# Patient Record
Sex: FEMALE | Race: WHITE | NOT HISPANIC OR LATINO | Employment: FULL TIME | URBAN - METROPOLITAN AREA
[De-identification: names, ages, dates, MRNs, and addresses within clinical notes are randomized per-mention and may not be internally consistent; named-entity substitution may affect disease eponyms.]

---

## 2017-02-01 ENCOUNTER — OFFICE VISIT (OUTPATIENT)
Dept: INTERNAL MEDICINE | Facility: CLINIC | Age: 42
End: 2017-02-01
Payer: COMMERCIAL

## 2017-02-01 VITALS
RESPIRATION RATE: 16 BRPM | DIASTOLIC BLOOD PRESSURE: 78 MMHG | HEART RATE: 69 BPM | TEMPERATURE: 98 F | BODY MASS INDEX: 23.7 KG/M2 | HEIGHT: 62 IN | WEIGHT: 128.8 LBS | OXYGEN SATURATION: 100 % | SYSTOLIC BLOOD PRESSURE: 116 MMHG

## 2017-02-01 DIAGNOSIS — J40 BRONCHITIS: Primary | ICD-10-CM

## 2017-02-01 PROCEDURE — 99213 OFFICE O/P EST LOW 20 MIN: CPT | Performed by: INTERNAL MEDICINE

## 2017-02-01 RX ORDER — AZITHROMYCIN 250 MG/1
TABLET, FILM COATED ORAL
Qty: 6 TABLET | Refills: 0 | Status: SHIPPED | OUTPATIENT
Start: 2017-02-01 | End: 2017-08-30

## 2017-02-01 NOTE — PATIENT INSTRUCTIONS
Please take the antibiotics and let us know if you are not better after 5 complete days on it.    You may use acetaminophen or ibuprofen and heating packs for your chest pain.    For the dryness under your eyes: stop using the new mascara and if the dryness is not better in 2 weeks, you can try using hydrocortisone (at the concentrations that they sell over the counter) for up to 2 weeks and if the rash is still there, you may need to contact your Dermatologist at that point.

## 2017-02-01 NOTE — NURSING NOTE
"Chief Complaint   Patient presents with     Cough       Initial /78 mmHg  Pulse 69  Temp(Src) 98  F (36.7  C) (Oral)  Resp 16  Ht 5' 2\" (1.575 m)  Wt 128 lb 12.8 oz (58.423 kg)  BMI 23.55 kg/m2  SpO2 100% Estimated body mass index is 23.55 kg/(m^2) as calculated from the following:    Height as of this encounter: 5' 2\" (1.575 m).    Weight as of this encounter: 128 lb 12.8 oz (58.423 kg).  BP completed using cuff size: erik Crawford CMA    "

## 2017-02-01 NOTE — MR AVS SNAPSHOT
After Visit Summary   2/1/2017    Megan Kearney    MRN: [id number]           Patient Information     Date Of Birth          1975        Visit Information        Provider Department      2/1/2017 2:20 PM Jaycee Ryan MD [facility]        Care Instructions    Please take the antibiotics and let us know if you are not better after 5 complete days on it.    You may use acetaminophen or ibuprofen and heating packs for your chest pain.    For the dryness under your eyes: stop using the new mascara and if the dryness is not better in 2 weeks, you can try using hydrocortisone (at the concentrations that they sell over the counter) for up to 2 weeks and if the rash is still there, you may need to contact your Dermatologist at that point.          Follow-ups after your visit        Who to contact     If you have questions or need follow up information about today's clinic visit or your schedule please contact [facility] directly at [phone].  Normal or non-critical lab and imaging results will be communicated to you by [employer], letter or phone within 4 business days after the clinic has received the results. If you do not hear from us within 7 days, please contact the clinic through [employer] or phone. If you have a critical or abnormal lab result, we will notify you by phone as soon as possible.  Submit refill requests through [employer] or call your pharmacy and they will forward the refill request to us. Please allow 3 business days for your refill to be completed.          Additional Information About Your Visit        [employer] Information     [employer] gives you secure access to your electronic health record. If you see a primary care provider, you can also send messages to your care team and make appointments. If you have questions, please call your primary care clinic.  If you do not have a primary care provider, please call [phone] and they will assist  "you.        Care EveryWhere ID     This is your Care EveryWhere ID. This could be used by other organizations to access your [place] medical records  [id number]        Your Vitals Were     Pulse Temperature Respirations Height BMI (Body Mass Index) Pulse Oximetry    69 98  F (36.7  C) (Oral) 16 5' 2\" (1.575 m) 23.55 kg/m2 100%       Blood Pressure from Last 3 Encounters:   02/01/17 116/78   08/15/16 111/75   07/07/16 114/71    Weight from Last 3 Encounters:   02/01/17 128 lb 12.8 oz (58.423 kg)   08/15/16 129 lb (58.514 kg)   07/07/16 126 lb (57.153 kg)              Today, you had the following     No orders found for display       Primary Care Provider Office Phone # Fax #    Bing Coughlin [phone]       [facility] [address] [facility] [id number]        Thank you!     Thank you for choosing [facility]  for your care. Our goal is always to provide you with excellent care. Hearing back from our patients is one way we can continue to improve our services. Please take a few minutes to complete the written survey that you may receive in the mail after your visit with us. Thank you!             Your Updated Medication List - Protect others around you: Learn how to safely use, store and throw away your medicines at www.disposemymeds.org.          This list is accurate as of: 2/1/17  3:31 PM.  Always use your most recent med list.                   Brand Name Dispense Instructions for use    aluminum chloride 20 % external solution    DRYSOL    60 mL    Apply topically At Bedtime As sweating improves, decrease use to 1-2 times weekly.       calcium + D 600-200 MG-UNIT Tabs   Generic drug:  calcium carbonate-vitamin D      1 TABLET DAILY       carboxymethylcellul-glycerin 0.5-0.9 % Soln ophthalmic solution    OPTIVE     Apply 1 drop to eye 3 times daily. \"Refresh Optive artificial tears\"       clindamycin 1 % topical gel    CLINDAMAX    60 g    Apply topically " 2 times daily Profile RX - do not fill until pt requests or is due for refill.       flax seed oil 1000 MG capsule      1 daily       fluticasone 50 MCG/ACT spray    FLONASE    48 g    Spray 2 sprays into both nostrils daily       MULTIVITAMIN TABS   OR      1 TABLET DAILY       mupirocin 2 % cream    BACTROBAN    30 g    Apply topically 3 times daily       triamcinolone 0.1 % cream    KENALOG    453.6 g    Apply  topically 2 times daily.       valACYclovir 1000 mg tablet    VALTREX    12 tablet    2 TABLETS every 12 hours for two doses       zolpidem 5 MG tablet    AMBIEN    90 tablet    Take 1 tablet (5 mg) by mouth nightly as needed for sleep at bedtime.

## 2017-02-01 NOTE — PROGRESS NOTES
SUBJECTIVE:                                                    Megan Kearney is a 41 year old female who presents to clinic today for the following health issues:      ENT Symptoms             Symptoms: cc Present Absent Comment   Fever/Chills  x  Almost 2 wks ago, not since then   Fatigue  x     Muscle Aches  x     Eye Irritation  x     Sneezing  x     Nasal Rickey/Drg  x  Clear secretions    Sinus Pressure/Pain  x  Just a bit    Loss of smell  x     Dental pain  x     Sore Throat   x    Swollen Glands   x    Ear Pain/Fullness  x  Just a bit of pear pain   Cough  x  She tries to avoid coughing anything up as it hurts to much to cough.    Wheeze  x     Chest Pain  x  Left-sided, pressure when she lies down . Its uncomfortable, pain with coughing and with taking a deep breath, feels like needles.  There is a comfortable position that she can find.  This chest pain began about 8 days ago-today, she felt that it may have gotten a bit worse.    Shortness of breath  x     Rash       Other  x  Dry under eyes     Symptom duration:  almost 2 weeks   Symptom severity:  mild can still function   Treatments tried:  none   Contacts:  minor colds     Has a history of chronic rhinorrhea.   No personal or family history of blood clots.    No recent immobility.          Problem list and histories reviewed & adjusted, as indicated.  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     Hx of LASIK     MMT (medial meniscus tear)     Psoriasis     Past Surgical History   Procedure Laterality Date     D & c       Lasik       2008     Hc hysteros w permanent fallopain implant       essure     Arthroscopy knee Right 5/27/2015     Procedure: ARTHROSCOPY KNEE;   "Surgeon: Dima Haile MD;  Location:  OR       Social History   Substance Use Topics     Smoking status: Never Smoker      Smokeless tobacco: Never Used      Comment: Nonsmoking household     Alcohol Use: No     Family History   Problem Relation Age of Onset     CANCER Paternal Grandmother 50     started as colon, mets to liver     DIABETES Paternal Grandmother      Hypertension Mother      Thyroid Disease Mother      Hypertension Maternal Grandmother      Hypertension Maternal Grandfather      GASTROINTESTINAL DISEASE Maternal Uncle      crohns     GASTROINTESTINAL DISEASE Sister      colitis     Glaucoma No family hx of      Macular Degeneration No family hx of          Current Outpatient Prescriptions   Medication Sig Dispense Refill     azithromycin (ZITHROMAX) 250 MG tablet Two tablets first day, then one tablet daily for four days. 6 tablet 0     fluticasone (FLONASE) 50 MCG/ACT nasal spray Spray 2 sprays into both nostrils daily 48 g 3     clindamycin (CLINDAMAX) 1 % gel Apply topically 2 times daily Profile RX - do not fill until pt requests or is due for refill. 60 g 12     zolpidem (AMBIEN) 5 MG tablet Take 1 tablet (5 mg) by mouth nightly as needed for sleep at bedtime. 90 tablet 1     valACYclovir (VALTREX) 1000 mg tablet 2 TABLETS every 12 hours for two doses 12 tablet 6     triamcinolone (KENALOG) 0.1 % cream Apply  topically 2 times daily. 453.6 g 1     mupirocin (BACTROBAN) 2 % cream Apply topically 3 times daily 30 g 3     carboxymethylcellul-glycerin (OPTIVE) 0.5-0.9 % SOLN Apply 1 drop to eye 3 times daily. \"Refresh Optive artificial tears\"       CALCIUM + D 600-200 MG-UNIT OR TABS 1 TABLET DAILY       MULTIVITAMIN TABS   OR 1 TABLET DAILY       FLAX SEED OIL 1000 MG OR CAPS 1 daily       aluminum chloride (DRYSOL) 20 % external solution Apply topically At Bedtime As sweating improves, decrease use to 1-2 times weekly. 60 mL 3 " "        ==============================================================  ROS:  Constitutional, HEENT, cardiovascular, pulmonary, GI, , musculoskeletal, neuro, skin, endocrine and psych systems are negative, except as otherwise noted.      OBJECTIVE:                                                    /78 mmHg  Pulse 69  Temp(Src) 98  F (36.7  C) (Oral)  Resp 16  Ht 5' 2\" (1.575 m)  Wt 128 lb 12.8 oz (58.423 kg)  BMI 23.55 kg/m2  SpO2 100%  Body mass index is 23.55 kg/(m^2).       GENERAL APPEARANCE: healthy, alert and in no distress  EYES: Eyes grossly normal to inspection, and conjunctivae and sclerae normal  HENT: ear canals and TM's normal, nose and mouth without ulcers or lesions, oropharynx clear and oral mucous membranes moist  NECK: no adenopathy, no asymmetry, masses, or scars   RESP: lungs clear to auscultation - no rales, rhonchi or wheezes  CV: regular rate and rhythm, normal S1 S2, no S3 or S4, no murmur, click or rub, no peripheral edema and peripheral pulses strong  ABDOMEN: soft, nontender, no hepatosplenomegaly, no masses and bowel sounds normal  MS:   Left middle back: tender to palpation   no musculoskeletal defects are noted and gait is age appropriate without ataxia  SKIN: no suspicious lesions or rashes  NEURO: mentation intact and speech normal  PSYCH: mentation appears normal and affect normal/bright.      ASSESSMENT/PLAN:                                                        ICD-10-CM    1. Bronchitis J40 azithromycin (ZITHROMAX) 250 MG tablet     Chest pain, etc:  With accompanying pleurisy and pulled back muscle  PLAN:  Patient Instructions   Please take the antibiotics and let us know if you are not better after 5 complete days on it.    You may use acetaminophen or ibuprofen and heating packs for your chest pain.    For the dryness under your eyes: stop using the new mascara and if the dryness is not better in 2 weeks, you can try using hydrocortisone (at the concentrations " that they sell over the counter) for up to 2 weeks and if the rash is still there, you may need to contact your Dermatologist at that point.                       Jaycee Ryan MD  [facility]

## 2017-02-03 ENCOUNTER — TELEPHONE (OUTPATIENT)
Dept: FAMILY MEDICINE | Facility: CLINIC | Age: 42
End: 2017-02-03

## 2017-02-03 NOTE — TELEPHONE ENCOUNTER
Patient calling states she has a mole on her Left thigh that Dr Smart was aware of and told her to watch and if it changed to see her. States has changed wondering if this can be scheduled for a removal. Please call to advise.

## 2017-02-08 NOTE — PROGRESS NOTES
SUBJECTIVE:                                                    Megan Kearney is a 41 year old female who presents to clinic today for the following health issues:      Check mole on left thigh , pt states that she had removed once before now has new growth present.  New growth did fall off and pt brought in mole to have reviewed   Partially crusted, parts that fell off are this crust.  No itch/pain/bleeding.  Pt reports if she knows it is benign it doesn't need to be removed.      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     Hx of LASIK     MMT (medial meniscus tear)     Psoriasis     Past Surgical History   Procedure Laterality Date     D & c       Lasik       2008     Hc hysteros w permanent fallopain implant       essure     Arthroscopy knee Right 5/27/2015     Procedure: ARTHROSCOPY KNEE;  Surgeon: Dima Haile MD;  Location:  OR       Social History   Substance Use Topics     Smoking status: Never Smoker     Smokeless tobacco: Never Used      Comment: Nonsmoking household     Alcohol use No     Family History   Problem Relation Age of Onset     CANCER Paternal Grandmother 50     started as colon, mets to liver     DIABETES Paternal Grandmother      Hypertension Mother      Thyroid Disease Mother      Hypertension Maternal Grandmother      Hypertension Maternal Grandfather      GASTROINTESTINAL DISEASE Sister      colitis     GASTROINTESTINAL DISEASE Maternal Uncle      crohns     Glaucoma No family hx of      Macular Degeneration No family hx of          Current Outpatient Prescriptions   Medication Sig Dispense Refill     azithromycin (ZITHROMAX) 250 MG tablet Two tablets first day, then one tablet daily for four days. 6 tablet 0     fluticasone  "(FLONASE) 50 MCG/ACT nasal spray Spray 2 sprays into both nostrils daily 48 g 3     clindamycin (CLINDAMAX) 1 % gel Apply topically 2 times daily Profile RX - do not fill until pt requests or is due for refill. 60 g 12     zolpidem (AMBIEN) 5 MG tablet Take 1 tablet (5 mg) by mouth nightly as needed for sleep at bedtime. 90 tablet 1     valACYclovir (VALTREX) 1000 mg tablet 2 TABLETS every 12 hours for two doses 12 tablet 6     triamcinolone (KENALOG) 0.1 % cream Apply  topically 2 times daily. 453.6 g 1     aluminum chloride (DRYSOL) 20 % external solution Apply topically At Bedtime As sweating improves, decrease use to 1-2 times weekly. 60 mL 3     mupirocin (BACTROBAN) 2 % cream Apply topically 3 times daily 30 g 3     carboxymethylcellul-glycerin (OPTIVE) 0.5-0.9 % SOLN Apply 1 drop to eye 3 times daily. \"Refresh Optive artificial tears\"       CALCIUM + D 600-200 MG-UNIT OR TABS 1 TABLET DAILY       MULTIVITAMIN TABS   OR 1 TABLET DAILY       FLAX SEED OIL 1000 MG OR CAPS 1 daily       BP Readings from Last 3 Encounters:   02/13/17 118/75   02/01/17 116/78   08/15/16 111/75    Wt Readings from Last 3 Encounters:   02/13/17 127 lb (57.6 kg)   02/01/17 128 lb 12.8 oz (58.4 kg)   08/15/16 129 lb (58.5 kg)                  Labs reviewed in EPIC  Problem list, Medication list, Allergies, and Medical/Social/Surgical histories reviewed in [facility] and updated as appropriate.    ROS:  Constitutional, HEENT, cardiovascular, pulmonary, gi and gu systems are negative, except as otherwise noted.    OBJECTIVE:                                                    /75 (Cuff Size: Adult Regular)  Temp 96.8  F (36  C) (Oral)  Ht 5' 2\" (1.575 m)  Wt 127 lb (57.6 kg)  BMI 23.23 kg/m2  Body mass index is 23.23 kg/(m^2).  GENERAL: healthy, alert and no distress  EYES: Eyes grossly normal to inspection, PERRL and conjunctivae and sclerae normal  MS: no gross musculoskeletal defects noted, no edema  SKIN: 1.5 cm flat lesion on upper " inner left thigh, some keratinic crusting, irregular pigment but sharp, defined border  PSYCH: mentation appears normal, affect normal/bright    Diagnostic Test Results:  none      ASSESSMENT/PLAN:                                                    (D48.5) Neoplasm of uncertain behavior of skin of lower extremity  (primary encounter diagnosis)  Comment: discussed options to include punch biopsy, shave (not recommended), full excision by derm  Plan: pt desires punch biopsy today  After informed written consent was obtained, using Betadine for cleansing and 2% Lidocaine with epinephrine for anesthetic, with sterile technique a 3 mm punch biopsy was used to obtain a biopsy specimen of the lesion. Hemostasis was obtained by pressure and wound was sutured with 1, 4'0 nylon. Antibiotic dressing is applied, and wound care instructions provided. Be alert for any signs of cutaneous infection. The specimen is labeled and sent to pathology for evaluation. The procedure was well tolerated without complications.     See Patient Instructions    Bing Coughlin MD  [employer] [facility]

## 2017-02-13 ENCOUNTER — OFFICE VISIT (OUTPATIENT)
Dept: FAMILY MEDICINE | Facility: CLINIC | Age: 42
End: 2017-02-13
Payer: COMMERCIAL

## 2017-02-13 VITALS
HEIGHT: 62 IN | SYSTOLIC BLOOD PRESSURE: 118 MMHG | BODY MASS INDEX: 23.37 KG/M2 | TEMPERATURE: 96.8 F | DIASTOLIC BLOOD PRESSURE: 75 MMHG | WEIGHT: 127 LBS

## 2017-02-13 DIAGNOSIS — D48.5 NEOPLASM OF UNCERTAIN BEHAVIOR OF SKIN OF LOWER EXTREMITY: Primary | ICD-10-CM

## 2017-02-13 PROCEDURE — 11402 EXC TR-EXT B9+MARG 1.1-2 CM: CPT | Performed by: FAMILY MEDICINE

## 2017-02-13 PROCEDURE — 88312 SPECIAL STAINS GROUP 1: CPT | Performed by: FAMILY MEDICINE

## 2017-02-13 PROCEDURE — 88305 TISSUE EXAM BY PATHOLOGIST: CPT | Performed by: FAMILY MEDICINE

## 2017-02-13 PROCEDURE — 99207 ZZC NO CHARGE LOS: CPT | Performed by: FAMILY MEDICINE

## 2017-02-13 NOTE — PATIENT INSTRUCTIONS
Keep wound dry for 24 hours then you can shower.  Do not submerge wound for 2 weeks.  Do not soak or scrub the wound. Do not take a bath, go swimming, or use a hot tub.   If recommended by your healthcare provider, you may put a small amount of antibiotic ointment on the wound each time you clean it. This can prevent infection. It will also help keep bandages from sticking to the wound. If a rash appears, stop using the ointment.   Make sure the wound is kept dry between washings. After showering or bathing, gently pat the wound dry with a soft towel.   Protect the wound from repeat injury until the skin has had time to heal.   Protect the wound from a lot of exposure to sunlight or tanning lamps while skin glue is in place. Wounds exposed to the sun can become red, while scars that have not been exposed to the sun usually turn white after a period of time.   Do not scratch, rub, or pick at your wound.    Any wound can become infected. When you are cleaning your wound, look for these signs of infection:   increased redness   red streaks   increased swelling   increased pain or tenderness   pus or other drainage   warmth in the area of the wound   fever.   Contact your provider if you see any signs of infection.

## 2017-02-13 NOTE — NURSING NOTE
"Chief Complaint   Patient presents with     Derm Problem       Initial /75 (Cuff Size: Adult Regular)  Temp 96.8  F (36  C) (Oral)  Ht 5' 2\" (1.575 m)  Wt 127 lb (57.6 kg)  BMI 23.23 kg/m2 Estimated body mass index is 23.23 kg/(m^2) as calculated from the following:    Height as of this encounter: 5' 2\" (1.575 m).    Weight as of this encounter: 127 lb (57.6 kg).  Medication Reconciliation: complete  Debbie Naidu , CMA     "

## 2017-02-13 NOTE — MR AVS SNAPSHOT
After Visit Summary   2/13/2017    Megan Kearney    MRN: [id number]           Patient Information     Date Of Birth          1975        Visit Information        Provider Department      2/13/2017 9:00 AM Bing Coughlin MD [facility]        Care Instructions    Keep wound dry for 24 hours then you can shower.  Do not submerge wound for 2 weeks.  Do not soak or scrub the wound. Do not take a bath, go swimming, or use a hot tub.   If recommended by your healthcare provider, you may put a small amount of antibiotic ointment on the wound each time you clean it. This can prevent infection. It will also help keep bandages from sticking to the wound. If a rash appears, stop using the ointment.   Make sure the wound is kept dry between washings. After showering or bathing, gently pat the wound dry with a soft towel.   Protect the wound from repeat injury until the skin has had time to heal.   Protect the wound from a lot of exposure to sunlight or tanning lamps while skin glue is in place. Wounds exposed to the sun can become red, while scars that have not been exposed to the sun usually turn white after a period of time.   Do not scratch, rub, or pick at your wound.    Any wound can become infected. When you are cleaning your wound, look for these signs of infection:   increased redness   red streaks   increased swelling   increased pain or tenderness   pus or other drainage   warmth in the area of the wound   fever.   Contact your provider if you see any signs of infection.           Follow-ups after your visit        Who to contact     If you have questions or need follow up information about today's clinic visit or your schedule please contact [facility] directly at [phone].  Normal or non-critical lab and imaging results will be communicated to you by MyChart, letter or phone within 4 business days after the clinic has received the results. If you do not  "hear from us within 7 days, please contact the clinic through [employer] or phone. If you have a critical or abnormal lab result, we will notify you by phone as soon as possible.  Submit refill requests through [employer] or call your pharmacy and they will forward the refill request to us. Please allow 3 business days for your refill to be completed.          Additional Information About Your Visit        [employer] Information     [employer] gives you secure access to your electronic health record. If you see a primary care provider, you can also send messages to your care team and make appointments. If you have questions, please call your primary care clinic.  If you do not have a primary care provider, please call [phone] and they will assist you.        Care EveryWhere ID     This is your Care EveryWhere ID. This could be used by other organizations to access your [facility] medical records  [id number]        Your Vitals Were     Temperature Height BMI (Body Mass Index)             96.8  F (36  C) (Oral) 5' 2\" (1.575 m) 23.23 kg/m2          Blood Pressure from Last 3 Encounters:   02/13/17 118/75   02/01/17 116/78   08/15/16 111/75    Weight from Last 3 Encounters:   02/13/17 127 lb (57.6 kg)   02/01/17 128 lb 12.8 oz (58.4 kg)   08/15/16 129 lb (58.5 kg)              Today, you had the following     No orders found for display       Primary Care Provider Office Phone # Fax #    Bing Coughlin [phone]       [facility] [address] [facility] [address]        Thank you!     Thank you for choosing [facility]  for your care. Our goal is always to provide you with excellent care. Hearing back from our patients is one way we can continue to improve our services. Please take a few minutes to complete the written survey that you may receive in the mail after your visit with us. Thank you!             Your Updated Medication List - Protect others around you: Learn how " "to safely use, store and throw away your medicines at www.disposemymeds.org.          This list is accurate as of: 2/13/17  9:29 AM.  Always use your most recent med list.                   Brand Name Dispense Instructions for use    aluminum chloride 20 % external solution    DRYSOL    60 mL    Apply topically At Bedtime As sweating improves, decrease use to 1-2 times weekly.       azithromycin 250 MG tablet    ZITHROMAX    6 tablet    Two tablets first day, then one tablet daily for four days.       calcium + D 600-200 MG-UNIT Tabs   Generic drug:  calcium carbonate-vitamin D      1 TABLET DAILY       carboxymethylcellul-glycerin 0.5-0.9 % Soln ophthalmic solution    OPTIVE     Apply 1 drop to eye 3 times daily. \"Refresh Optive artificial tears\"       clindamycin 1 % topical gel    CLINDAMAX    60 g    Apply topically 2 times daily Profile RX - do not fill until pt requests or is due for refill.       flax seed oil 1000 MG capsule      1 daily       fluticasone 50 MCG/ACT spray    FLONASE    48 g    Spray 2 sprays into both nostrils daily       MULTIVITAMIN TABS   OR      1 TABLET DAILY       mupirocin 2 % cream    BACTROBAN    30 g    Apply topically 3 times daily       triamcinolone 0.1 % cream    KENALOG    453.6 g    Apply  topically 2 times daily.       valACYclovir 1000 mg tablet    VALTREX    12 tablet    2 TABLETS every 12 hours for two doses       zolpidem 5 MG tablet    AMBIEN    90 tablet    Take 1 tablet (5 mg) by mouth nightly as needed for sleep at bedtime.         "

## 2017-02-13 NOTE — LETTER
"                                                       [facility]  [address] [facility] [id number] [phone]  Affirmation of Informed Consent for Surgery or Invasive Procedure    1.  I, (print patient's name) Megan Kearney,  1975,   a.  Agree that I will have (include both the medical term and patient words):           Chief Complaint   Patient presents with     Derm Problem      b.  At [facility].     c.  The reason for this procedure is (medical condition):  Diagnostic punch biopsy.   d.  This will be done or supervised by:  Bing Coughlin MD.   e.  My doctor may have help from others.  Help could include opening or closing         the wound. Help might also include taking grafts, cutting out tissue,                           implanting devices.  I have been told who will help, if known.     2.  I have talked to my doctor or health care team about:   a.  What the procedure is and what will happen.   b   How it may help me (the benefits).   c.  How it may harm me (the most likely and most serious risks).   d.  The long-term effects the procedure might have.    e.  My other choices for treatment.  The risks and benefits of those choices.    f.   What will likely happen if I say \"no\" to this procedure.    g.  How I might feel right after and how quickly I might be expected to recover.      h.  What medicines will be used to manage pain or sedate me.     3.  I agree that:  (If I do not agree with a statement, I have crossed it out and              initialed next to it.)       a.  I will ask questions.     b.  No one has promised me definite results.    c.  If serious problems are found during the procedure, the treatment may                    change.   d.  If I have \"do not resuscitate\" (DNR) wishes, I have discussed this with my                              doctor and they will be put on hold during the procedure.   e. Students and other appropriate people, approved " by the facility, may watch                      the procedure and help with tasks they are qualified for.                                                    f.  Pictures or videos may be taken. They may be used for medical or                  educational reasons only.       g. Tissues or organs removed from my body as part of the normal course of the                    procedure may be used for research or teaching purposes. They will be                  disposed of with respect.                    h.  If a staff person is exposed to my blood or body fluids, my blood will be drawn                   and tested for HIV and hepatitis.  I understand that by law, the test results will                    go:         -  In my medical record.                         -  To the Employee Occupational Health Service and/or Infection Control                                  at this facility.    -  To the [place] [facility] officials.    i.  I may have a blood transfusion: I have talked to my doctor or care team about:    -  Why I may need a blood transfusion.     - The risks, benefits, and side effects of transfusion - and the risks of not        Having one.     -  Blood safety and other options for treatment.        Consent for blood transfusion obtained during a hospital admission is valid for the entire hospital stay.  Consent  for blood transfusion obtained in the clinic setting is valid for a year from the signature date.                                4.  I understand that:   a.  I can change my mind.  If I do, I must tell my doctor or team as soon as                           possible.              b.  The team members may change during the procedure.                c.   The team will double-check who I am.  They will ask me what I am having                         done and the site of the site of the procedure.  This is done for my safety.    My questions have been answered.  I agree to the procedure.  I have made my  special needs and instructions known.      Megan Christel      2/13/2017 9:14 AM  Patient (or representative)/Relationship to patient             Date  Time    For telephone consent:      2/13/2017  9:14 AM         Date  Time  Print name of patient (or representative)/relationship to patient    Witness:  I have verified that the signature is that of the patient or patient's representative and that this has been signed before the procedure:    NAME:        2/13/2017  9:14 AM         Date  Time  Person verifying patient's name or patient representative's signature     Provider:  I have discussed the procedure and the information stated above with the patient (or the patient's representative) and answered their questions. The patient or their representative consented to the procedure:      Bing Coughlin MD    2/13/2017  9:14 AM  Physician or Provider's Signature(s)   Date  Time       Intepreter (if used):       2/13/2017  9:14 AM                                   Name       Language/Organization Date  Time    Consent for procedure valid for 30 days after patient signature date     [facility]  AFFIRMATION OF INFORMED CONSENT FOR SURGERY OR INVASIVE PROCEDURE               Original - Medical Records

## 2017-02-16 LAB — COPATH REPORT: NORMAL

## 2017-02-20 ENCOUNTER — ALLIED HEALTH/NURSE VISIT (OUTPATIENT)
Dept: NURSING | Facility: CLINIC | Age: 42
End: 2017-02-20
Payer: COMMERCIAL

## 2017-02-20 DIAGNOSIS — Z48.02 ENCOUNTER FOR REMOVAL OF SUTURES: Primary | ICD-10-CM

## 2017-02-20 PROCEDURE — 99207 ZZC NO CHARGE NURSE ONLY: CPT

## 2017-02-20 NOTE — PROGRESS NOTES
Megan presents to the clinic today for  removal of suture.  The patient has had the sutures and suture in place for 7 days.    There has been no history of infection or drainage.    O: 1 sutures and suture are seen located on the Left inner/upper thigh.  The wound is healing well with no signs of infection.    Tetanus status is up to date.    A: Suture removal.    P:  All  suture were easily removed today.  Routine wound care discussed.  The patient will follow up as needed.    Per Dr Bing Coughlin, results are benign.   Will result biopsy today.   Verbalized good understanding.

## 2017-08-28 NOTE — PATIENT INSTRUCTIONS
Call your insurance to ask about mammogram coverage and if 3D mammogram is covered for screening.         Preventive Health Recommendations  Female Ages 40 to 49    Yearly exam:     See your health care provider every year in order to  1. Review health changes.   2. Discuss preventive care.    3. Review your medicines if your doctor prescribed any.      Get a Pap test every three years (unless you have an abnormal result and your provider advises testing more often).      If you get Pap tests with HPV test, you only need to test every 5 years, unless you have an abnormal result. You do not need a Pap test if your uterus was removed (hysterectomy) and you have not had cancer.      You should be tested each year for STDs (sexually transmitted diseases), if you're at risk.       Ask your doctor if you should have a mammogram.      Have a colonoscopy (test for colon cancer) if someone in your family has had colon cancer or polyps before age 50.       Have a cholesterol test every 5 years.       Have a diabetes test (fasting glucose) after age 45. If you are at risk for diabetes, you should have this test every 3 years.    Shots: Get a flu shot each year. Get a tetanus shot every 10 years.     Nutrition:     Eat at least 5 servings of fruits and vegetables each day.    Eat whole-grain bread, whole-wheat pasta and brown rice instead of white grains and rice.    Talk to your provider about Calcium and Vitamin D.     Lifestyle    Exercise at least 150 minutes a week (an average of 30 minutes a day, 5 days a week). This will help you control your weight and prevent disease.    Limit alcohol to one drink per day.    No smoking.     Wear sunscreen to prevent skin cancer.    See your dentist every six months for an exam and cleaning.

## 2017-08-28 NOTE — PROGRESS NOTES
SUBJECTIVE:   CC: Megan Kearney is an 41 year old woman who presents for preventive health visit.     Healthy Habits:    Do you get at least three servings of calcium containing foods daily (dairy, green leafy vegetables, etc.)? yes    Amount of exercise or daily activities, outside of work: 4-5 day(s) per week    Problems taking medications regularly No    Medication side effects: No    Have you had an eye exam in the past two years? yes    Do you see a dentist twice per year? yes    Do you have sleep apnea, excessive snoring or daytime drowsiness?no              Today's PHQ-2 Score:   PHQ-2 ( 1999 Pf[employer]) 8/30/2017 2/1/2017   Q1: Little interest or pleasure in doing things 0 0   Q2: Feeling down, depressed or hopeless 0 0   PHQ-2 Score 0 0   Q1: Little interest or pleasure in doing things Not at all -   Q2: Feeling down, depressed or hopeless Not at all -   PHQ-2 Score 0 -       Abuse: Current or Past(Physical, Sexual or Emotional)- No  Do you feel safe in your environment - Yes    Social History   Substance Use Topics     Smoking status: Never Smoker     Smokeless tobacco: Never Used      Comment: Nonsmoking household     Alcohol use No     The patient does not drink >3 drinks per day nor >7 drinks per week.    Reviewed orders with patient.  Reviewed health maintenance and updated orders accordingly - Yes    G 3 P 2   Patient's last menstrual period was 08/07/2017.     Fasting: Yes    Td:Tdap 2008       Last 3 Pap Results:   PAP (no units)   Date Value   08/07/2015 NIL   09/25/2012 NIL   09/15/2009 NIL        HPV: neg 2009          Cholesterol:   Lab Results   Component Value Date    CHOL 167 07/31/2015     Lab Results   Component Value Date    HDL 56 07/31/2015     Lab Results   Component Value Date     07/31/2015     Lab Results   Component Value Date    TRIG 53 07/31/2015     Lab Results   Component Value Date    CHOLHDLRATIO 3.0 07/31/2015         MMG: 10/14  Dexa:  No     Flex/colo:  2009      Seat Belt: Yes    Sunscreen use: Yes   Calcium Intake: adeq  Health Care Directive: Yes   Sexually Active: Yes     Current contraception: essure  History of abnormal Pap smear: No  Family history of colon/breast/ovarian cancer: Yes: see [facility]  Regular self breast exam: Yes  History of abnormal mammogram: No       Labs reviewed in EPIC  BP Readings from Last 3 Encounters:   08/30/17 104/73   02/13/17 118/75   02/01/17 116/78    Wt Readings from Last 3 Encounters:   08/30/17 127 lb (57.6 kg)   02/13/17 127 lb (57.6 kg)   02/01/17 128 lb 12.8 oz (58.4 kg)                  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     Hx of LASIK     MMT (medial meniscus tear)     Psoriasis     Past Surgical History:   Procedure Laterality Date     ARTHROSCOPY KNEE Right 5/27/2015    Procedure: ARTHROSCOPY KNEE;  Surgeon: Dima Haile MD;  Location: [facility] OR     D & C       HC HYSTEROS W PERMANENT FALLOPAIN IMPLANT      essure     LASIK      2008       Social History   Substance Use Topics     Smoking status: Never Smoker     Smokeless tobacco: Never Used      Comment: Nonsmoking household     Alcohol use No     Family History   Problem Relation Age of Onset     CANCER Paternal Grandmother 50     started as colon, mets to liver     DIABETES Paternal Grandmother      Hypertension Mother      Thyroid Disease Mother      Hypertension Maternal Grandmother      Hypertension Maternal Grandfather      GASTROINTESTINAL DISEASE Sister      colitis     GASTROINTESTINAL DISEASE Maternal Uncle      crohns     Glaucoma No family hx of      Macular Degeneration No family hx of          Current Outpatient Prescriptions   Medication Sig Dispense Refill     clindamycin (CLINDAMAX) 1 % topical  "gel Apply topically 2 times daily Profile RX - do not fill until pt requests or is due for refill. 60 g 12     zolpidem (AMBIEN) 5 MG tablet Take 1 tablet (5 mg) by mouth nightly as needed for sleep at bedtime. 90 tablet 1     valACYclovir (VALTREX) 1000 mg tablet 2 TABLETS every 12 hours for two doses 12 tablet 6     fluticasone (FLONASE) 50 MCG/ACT nasal spray Spray 2 sprays into both nostrils daily 48 g 3     triamcinolone (KENALOG) 0.1 % cream Apply  topically 2 times daily. 453.6 g 1     mupirocin (BACTROBAN) 2 % cream Apply topically 3 times daily 30 g 3     carboxymethylcellul-glycerin (OPTIVE) 0.5-0.9 % SOLN Apply 1 drop to eye 3 times daily. \"Refresh Optive artificial tears\"       CALCIUM + D 600-200 MG-UNIT OR TABS 1 TABLET DAILY       MULTIVITAMIN TABS   OR 1 TABLET DAILY       FLAX SEED OIL 1000 MG OR CAPS 1 daily       [DISCONTINUED] valACYclovir (VALTREX) 1000 mg tablet 2 TABLETS every 12 hours for two doses 12 tablet 6           Patient over age 50, mutual decision to screen reflected in health maintenance.      Pertinent mammograms are reviewed under the imaging tab.      Reviewed and updated as needed this visit by clinical staff  Tobacco  Allergies         Reviewed and updated as needed this visit by Provider            ROS:  C: NEGATIVE for fever, chills, change in weight  I: NEGATIVE for worrisome rashes, moles or lesions  E: NEGATIVE for vision changes or irritation  ENT: NEGATIVE for ear, mouth and throat problems  R: NEGATIVE for significant cough or SOB  B: NEGATIVE for masses, tenderness or discharge  CV: NEGATIVE for chest pain, palpitations or peripheral edema  GI: NEGATIVE for nausea, abdominal pain, heartburn, or change in bowel habits  : NEGATIVE for unusual urinary or vaginal symptoms. Periods are regular.  M: NEGATIVE for significant arthralgias or myalgia  N: NEGATIVE for weakness, dizziness or paresthesias  P: NEGATIVE for changes in mood or affect    OBJECTIVE:   /73  " "Pulse 69  Temp 97  F (36.1  C) (Oral)  Ht 5' 2\" (1.575 m)  Wt 127 lb (57.6 kg)  LMP 08/07/2017  BMI 23.23 kg/m2  EXAM:  GENERAL: healthy, alert and no distress  EYES: Eyes grossly normal to inspection, PERRL and conjunctivae and sclerae normal  HENT: ear canals and TM's normal, nose and mouth without ulcers or lesions  NECK: no adenopathy, no asymmetry, masses, or scars and thyroid normal to palpation  RESP: lungs clear to auscultation - no rales, rhonchi or wheezes  BREAST: normal without masses, tenderness or nipple discharge and no palpable axillary masses or adenopathy  CV: regular rate and rhythm, normal S1 S2, no S3 or S4, no murmur, click or rub, no peripheral edema and peripheral pulses strong  ABDOMEN: soft, nontender, no hepatosplenomegaly, no masses and bowel sounds normal  MS: no gross musculoskeletal defects noted, no edema  SKIN: no suspicious lesions or rashes  NEURO: Normal strength and tone, mentation intact and speech normal  PSYCH: mentation appears normal, affect normal/bright    ASSESSMENT/PLAN:   (Z00.00) Routine general medical examination at a health care facility  (primary encounter diagnosis)  Comment: preventive needs reviewed  Plan: see orders in Epic.     (L71.0) POD (perioral dermatitis)  Comment: controlled  Plan: clindamycin (CLINDAMAX) 1 % topical gel        Refill x 1 yr     (G47.00) Insomnia, unspecified type  Comment: intermittent use  Plan: zolpidem (AMBIEN) 5 MG tablet         Refill x 6 months     (B00.1) Herpes labialis  Comment: controlled  Plan: valACYclovir (VALTREX) 1000 mg tablet        Refill x 1 yr     (L40.9) Psoriasis  Comment: stable  Plan: ok to refill topical steroid if needed      (Z23) Need for prophylactic vaccination and inoculation against influenza  Comment: due  Plan: FLU VAC, SPLIT VIRUS IM > 3 YO (QUADRIVALENT)         [11795], Vaccine Administration, Initial         [08068]            (Z12.31) Encounter for screening mammogram for breast " "cancer  Comment: due  Plan: *MA Screening Digital Bilateral        \      COUNSELING:   Reviewed preventive health counseling, as reflected in patient instructions  Special attention given to:        Regular exercise       Healthy diet/nutrition         reports that she has never smoked. She has never used smokeless tobacco.    Estimated body mass index is 23.23 kg/(m^2) as calculated from the following:    Height as of this encounter: 5' 2\" (1.575 m).    Weight as of this encounter: 127 lb (57.6 kg).         Counseling Resources:  ATP IV Guidelines  Pooled Cohorts Equation Calculator  Breast Cancer Risk Calculator  FRAX Risk Assessment  ICSI Preventive Guidelines  Dietary Guidelines for Americans, 2010  USDA's MyPlate  ASA Prophylaxis  Lung CA Screening    Bing Coughlin MD  [facility]  "

## 2017-08-30 ENCOUNTER — OFFICE VISIT (OUTPATIENT)
Dept: FAMILY MEDICINE | Facility: CLINIC | Age: 42
End: 2017-08-30
Payer: COMMERCIAL

## 2017-08-30 VITALS
HEIGHT: 62 IN | BODY MASS INDEX: 23.37 KG/M2 | SYSTOLIC BLOOD PRESSURE: 104 MMHG | WEIGHT: 127 LBS | DIASTOLIC BLOOD PRESSURE: 73 MMHG | HEART RATE: 69 BPM | TEMPERATURE: 97 F

## 2017-08-30 DIAGNOSIS — Z12.31 ENCOUNTER FOR SCREENING MAMMOGRAM FOR BREAST CANCER: ICD-10-CM

## 2017-08-30 DIAGNOSIS — B00.1 HERPES LABIALIS: ICD-10-CM

## 2017-08-30 DIAGNOSIS — G47.00 INSOMNIA, UNSPECIFIED TYPE: ICD-10-CM

## 2017-08-30 DIAGNOSIS — L40.9 PSORIASIS: ICD-10-CM

## 2017-08-30 DIAGNOSIS — Z00.00 ROUTINE GENERAL MEDICAL EXAMINATION AT A HEALTH CARE FACILITY: Primary | ICD-10-CM

## 2017-08-30 DIAGNOSIS — Z23 NEED FOR PROPHYLACTIC VACCINATION AND INOCULATION AGAINST INFLUENZA: ICD-10-CM

## 2017-08-30 DIAGNOSIS — L71.0 POD (PERIORAL DERMATITIS): ICD-10-CM

## 2017-08-30 PROCEDURE — 90471 IMMUNIZATION ADMIN: CPT | Performed by: FAMILY MEDICINE

## 2017-08-30 PROCEDURE — 99396 PREV VISIT EST AGE 40-64: CPT | Mod: 25 | Performed by: FAMILY MEDICINE

## 2017-08-30 PROCEDURE — 90686 IIV4 VACC NO PRSV 0.5 ML IM: CPT | Performed by: FAMILY MEDICINE

## 2017-08-30 RX ORDER — TRIAMCINOLONE ACETONIDE 1 MG/G
CREAM TOPICAL
Qty: 453.6 G | Refills: 1 | Status: CANCELLED | OUTPATIENT
Start: 2017-08-30

## 2017-08-30 RX ORDER — VALACYCLOVIR HYDROCHLORIDE 1 G/1
TABLET, FILM COATED ORAL
Qty: 12 TABLET | Refills: 6 | Status: SHIPPED | OUTPATIENT
Start: 2017-08-30 | End: 2020-02-13

## 2017-08-30 RX ORDER — ZOLPIDEM TARTRATE 5 MG/1
5 TABLET ORAL
Qty: 90 TABLET | Refills: 1 | Status: SHIPPED | OUTPATIENT
Start: 2017-08-30 | End: 2020-02-13

## 2017-08-30 RX ORDER — FLUTICASONE PROPIONATE 50 MCG
2 SPRAY, SUSPENSION (ML) NASAL DAILY
Qty: 48 G | Refills: 3 | Status: CANCELLED | OUTPATIENT
Start: 2017-08-30

## 2017-08-30 RX ORDER — CLINDAMYCIN PHOSPHATE 10 MG/G
GEL TOPICAL 2 TIMES DAILY
Qty: 60 G | Refills: 12 | Status: SHIPPED | OUTPATIENT
Start: 2017-08-30 | End: 2020-02-13

## 2017-08-30 NOTE — PROGRESS NOTES
Injectable Influenza Immunization Documentation    1.  Is the person to be vaccinated sick today?  No    2. Does the person to be vaccinated have an allergy to eggs or to a component of the vaccine?  No    3. Has the person to be vaccinated today ever had a serious reaction to influenza vaccine in the past?  No    4. Has the person to be vaccinated ever had Guillain-[place] syndrome?  No     Form completed by Debbie Naidu CMA

## 2017-08-30 NOTE — MR AVS SNAPSHOT
After Visit Summary   8/30/2017    Megan Kearney    MRN: [id number]           Patient Information     Date Of Birth          1975        Visit Information        Provider Department      8/30/2017 9:15 AM Bing Coughlin MD [facility]        Today's Diagnoses     Routine general medical examination at a health care facility    -  1    POD (perioral dermatitis)        Insomnia, unspecified type        Herpes labialis        Psoriasis        Generalized hyperhidrosis        Need for prophylactic vaccination and inoculation against influenza        Encounter for screening mammogram for breast cancer          Care Instructions        Call your insurance to ask about mammogram coverage and if 3D mammogram is covered for screening.         Preventive Health Recommendations  Female Ages 40 to 49    Yearly exam:     See your health care provider every year in order to  1. Review health changes.   2. Discuss preventive care.    3. Review your medicines if your doctor prescribed any.      Get a Pap test every three years (unless you have an abnormal result and your provider advises testing more often).      If you get Pap tests with HPV test, you only need to test every 5 years, unless you have an abnormal result. You do not need a Pap test if your uterus was removed (hysterectomy) and you have not had cancer.      You should be tested each year for STDs (sexually transmitted diseases), if you're at risk.       Ask your doctor if you should have a mammogram.      Have a colonoscopy (test for colon cancer) if someone in your family has had colon cancer or polyps before age 50.       Have a cholesterol test every 5 years.       Have a diabetes test (fasting glucose) after age 45. If you are at risk for diabetes, you should have this test every 3 years.    Shots: Get a flu shot each year. Get a tetanus shot every 10 years.     Nutrition:     Eat at least 5 servings of fruits and  vegetables each day.    Eat whole-grain bread, whole-wheat pasta and brown rice instead of white grains and rice.    Talk to your provider about Calcium and Vitamin D.     Lifestyle    Exercise at least 150 minutes a week (an average of 30 minutes a day, 5 days a week). This will help you control your weight and prevent disease.    Limit alcohol to one drink per day.    No smoking.     Wear sunscreen to prevent skin cancer.    See your dentist every six months for an exam and cleaning.          Follow-ups after your visit        Future tests that were ordered for you today     Open Future Orders        Priority Expected Expires Ordered    *MA Screening Digital Bilateral Routine  8/30/2018 8/30/2017            Who to contact     If you have questions or need follow up information about today's clinic visit or your schedule please contact [facility] directly at [phone].  Normal or non-critical lab and imaging results will be communicated to you by [employer], letter or phone within 4 business days after the clinic has received the results. If you do not hear from us within 7 days, please contact the clinic through [employer] or phone. If you have a critical or abnormal lab result, we will notify you by phone as soon as possible.  Submit refill requests through [employer] or call your pharmacy and they will forward the refill request to us. Please allow 3 business days for your refill to be completed.          Additional Information About Your Visit        MOGL Information     [employer] gives you secure access to your electronic health record. If you see a primary care provider, you can also send messages to your care team and make appointments. If you have questions, please call your primary care clinic.  If you do not have a primary care provider, please call [phone] and they will assist you.        Care EveryWhere ID     This is your Care EveryWhere ID. This could be used by other organizations to  "access your [facility] medical records  [id number]        Your Vitals Were     Pulse Temperature Height Last Period BMI (Body Mass Index)       69 97  F (36.1  C) (Oral) 5' 2\" (1.575 m) 08/07/2017 23.23 kg/m2        Blood Pressure from Last 3 Encounters:   08/30/17 104/73   02/13/17 118/75   02/01/17 116/78    Weight from Last 3 Encounters:   08/30/17 127 lb (57.6 kg)   02/13/17 127 lb (57.6 kg)   02/01/17 128 lb 12.8 oz (58.4 kg)              We Performed the Following     FLU VAC, SPLIT VIRUS IM > 3 YO (QUADRIVALENT) [00925]     Vaccine Administration, Initial [64227]          Where to get your medicines      These medications were sent to [id number] - [place] [facility], [place] - [address] [address]  [address] [place] [facility] [address]    Hours:  test fax successful 4/5/04 kr Phone:  [phone]     clindamycin 1 % topical gel    valACYclovir 1000 mg tablet         Some of these will need a paper prescription and others can be bought over the counter.  Ask your nurse if you have questions.     Bring a paper prescription for each of these medications     zolpidem 5 MG tablet          Primary Care Provider Office Phone # Fax #    Bing Coughlin [phone] [address] [facility] [address]        Equal Access to Services     PHOEBE RAI : Hadii bev chungo Sodanisha, waaxda luqadaha, qaybta kaalmada adeegyada, waxmckay caren degroot ademichele hernandez. So [facility] [phone].    ATENCIÓN: Si habla español, tiene a deal disposición servicios gratuitos de asistencia lingüística. Jr al [phone].    We comply with applicable federal civil rights laws and [place] laws. We do not discriminate on the basis of race, color, national origin, age, disability sex, sexual orientation or gender identity.            Thank you!     Thank you for choosing [facility]  for your care. Our goal is always to provide you with excellent care. Hearing back from our patients " "is one way we can continue to improve our services. Please take a few minutes to complete the written survey that you may receive in the mail after your visit with us. Thank you!             Your Updated Medication List - Protect others around you: Learn how to safely use, store and throw away your medicines at www.disposemymeds.org.          This list is accurate as of: 8/30/17  9:53 AM.  Always use your most recent med list.                   Brand Name Dispense Instructions for use Diagnosis    calcium + D 600-200 MG-UNIT Tabs   Generic drug:  calcium carbonate-vitamin D      1 TABLET DAILY        carboxymethylcellul-glycerin 0.5-0.9 % Soln ophthalmic solution    OPTIVE     Apply 1 drop to eye 3 times daily. \"Refresh Optive artificial tears\"    Dry eyes       clindamycin 1 % topical gel    CLINDAMAX    60 g    Apply topically 2 times daily Profile RX - do not fill until pt requests or is due for refill.    POD (perioral dermatitis)       flax seed oil 1000 MG capsule      1 daily        fluticasone 50 MCG/ACT spray    FLONASE    48 g    Spray 2 sprays into both nostrils daily    Chronic rhinitis       MULTIVITAMIN TABS   OR      1 TABLET DAILY        mupirocin 2 % cream    BACTROBAN    30 g    Apply topically 3 times daily    POD (perioral dermatitis)       triamcinolone 0.1 % cream    KENALOG    453.6 g    Apply  topically 2 times daily.    Psoriasis       valACYclovir 1000 mg tablet    VALTREX    12 tablet    2 TABLETS every 12 hours for two doses    Herpes labialis       zolpidem 5 MG tablet    AMBIEN    90 tablet    Take 1 tablet (5 mg) by mouth nightly as needed for sleep at bedtime.    Insomnia, unspecified type         "

## 2017-08-30 NOTE — NURSING NOTE
"Chief Complaint   Patient presents with     Physical       Initial /73  Pulse 69  Temp 97  F (36.1  C) (Oral)  Ht 5' 2\" (1.575 m)  Wt 127 lb (57.6 kg)  LMP 08/07/2017  BMI 23.23 kg/m2 Estimated body mass index is 23.23 kg/(m^2) as calculated from the following:    Height as of this encounter: 5' 2\" (1.575 m).    Weight as of this encounter: 127 lb (57.6 kg).  Medication Reconciliation: complete  Debbie Naidu , JERSON     "

## 2017-09-22 ENCOUNTER — RADIANT APPOINTMENT (OUTPATIENT)
Dept: MAMMOGRAPHY | Facility: CLINIC | Age: 42
End: 2017-09-22
Attending: FAMILY MEDICINE
Payer: COMMERCIAL

## 2017-09-22 DIAGNOSIS — Z12.31 VISIT FOR SCREENING MAMMOGRAM: ICD-10-CM

## 2017-09-22 PROCEDURE — 77063 BREAST TOMOSYNTHESIS BI: CPT | Mod: TC

## 2017-09-22 PROCEDURE — G0202 SCR MAMMO BI INCL CAD: HCPCS | Mod: TC

## 2017-12-06 ENCOUNTER — TRANSFERRED RECORDS (OUTPATIENT)
Dept: HEALTH INFORMATION MANAGEMENT | Facility: CLINIC | Age: 42
End: 2017-12-06

## 2017-12-21 ENCOUNTER — TRANSFERRED RECORDS (OUTPATIENT)
Dept: HEALTH INFORMATION MANAGEMENT | Facility: CLINIC | Age: 42
End: 2017-12-21

## 2018-10-08 ENCOUNTER — HEALTH MAINTENANCE LETTER (OUTPATIENT)
Age: 43
End: 2018-10-08

## 2020-02-11 NOTE — PROGRESS NOTES
SUBJECTIVE:   CC: Megan Kearney is an 44 year old woman who presents for preventive health visit.     Healthy Habits:     Getting at least 3 servings of Calcium per day:  Yes    Bi-annual eye exam:  Yes    Dental care twice a year:  Yes    Sleep apnea or symptoms of sleep apnea:  None    Diet:  Regular (no restrictions)    Frequency of exercise:  6-7 days/week    Duration of exercise:  30-45 minutes    Taking medications regularly:  Yes    Medication side effects:  None    PHQ-2 Total Score: 0    Additional concerns today:  Yes        Today's PHQ-2 Score:   PHQ-2 (  [employer]) 2020   Q1: Little interest or pleasure in doing things 0   Q2: Feeling down, depressed or hopeless 0   PHQ-2 Score 0   Q1: Little interest or pleasure in doing things Not at all   Q2: Feeling down, depressed or hopeless Not at all   PHQ-2 Score 0       Abuse: Current or Past(Physical, Sexual or Emotional)- No  Do you feel safe in your environment? Yes        Social History     Tobacco Use     Smoking status: Never Smoker     Smokeless tobacco: Never Used     Tobacco comment: Nonsmoking household   Substance Use Topics     Alcohol use: No     Alcohol/week: 0.0 standard drinks         Alcohol Use 2020   Prescreen: >3 drinks/day or >7 drinks/week? Not Applicable   Prescreen: >3 drinks/day or >7 drinks/week? -       Reviewed orders with patient.  Reviewed health maintenance and updated orders accordingly - Yes    G 3 P 2   No LMP recorded.     Fasting: No   Td: tdap  due      Last 3 Pap and HPV Results:   PAP / HPV 2015 2012 9/15/2009   PAP NIL NIL NIL                  Cholesterol:   Lab Results   Component Value Date    CHOL 167 2015     Lab Results   Component Value Date    HDL 56 2015     Lab Results   Component Value Date     2015     Lab Results   Component Value Date    TRIG 53 2015     Lab Results   Component Value Date    CHOLHDLRATIO 3.0 2015         MM/17  Dexa:   NA     Flex/colo: NA      Seat Belt: Yes    Sunscreen use: Yes   Calcium Intake: adeq  Health Care Directive: Yes   Sexually Active: Yes     Current contraception: Essure  History of abnormal Pap smear: No  Family history of colon/breast/ovarian cancer: Yes: see [facility]  Regular self breast exam: Yes  History of abnormal mammogram: No      Labs reviewed in EPIC  BP Readings from Last 3 Encounters:   02/13/20 118/76   08/30/17 104/73   02/13/17 118/75    Wt Readings from Last 3 Encounters:   02/13/20 56.7 kg (125 lb)   08/30/17 57.6 kg (127 lb)   02/13/17 57.6 kg (127 lb)                  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     Hx of LASIK     MMT (medial meniscus tear)     Psoriasis     Herpes labialis     Past Surgical History:   Procedure Laterality Date     ARTHROSCOPY KNEE Right 5/27/2015    Procedure: ARTHROSCOPY KNEE;  Surgeon: Dima Haile MD;  Location:  OR     D & C       HC HYSTEROS W PERMANENT FALLOPAIN IMPLANT      essure     LASIK      2008       Social History     Tobacco Use     Smoking status: Never Smoker     Smokeless tobacco: Never Used     Tobacco comment: Nonsmoking household   Substance Use Topics     Alcohol use: No     Alcohol/week: 0.0 standard drinks     Family History   Problem Relation Age of Onset     Cancer Paternal Grandmother 50        started as colon, mets to liver     Diabetes Paternal Grandmother      Hypertension Mother      Thyroid Disease Mother      Hypertension Maternal Grandmother      Hypertension Maternal Grandfather      Gastrointestinal Disease Sister         colitis     Gastrointestinal Disease Maternal Uncle         crohns     Glaucoma No family hx of      Macular Degeneration No family hx of          Current  "Outpatient Medications   Medication Sig Dispense Refill     CALCIUM + D 600-200 MG-UNIT OR TABS 1 TABLET DAILY       carboxymethylcellul-glycerin (OPTIVE) 0.5-0.9 % SOLN Apply 1 drop to eye 3 times daily. \"Refresh Optive artificial tears\"       clindamycin (CLINDAMAX) 1 % topical gel Apply topically 2 times daily Profile RX - do not fill until pt requests or is due for refill. 60 g 12     fluticasone (FLONASE) 50 MCG/ACT nasal spray Spray 2 sprays into both nostrils daily 48 g 3     LUTEIN PO Take by mouth daily       MULTIVITAMIN TABS   OR 1 TABLET DAILY       mupirocin (BACTROBAN) 2 % cream Apply topically 3 times daily 30 g 3     Omega-3 Fatty Acids (FISH OIL PO) Take by mouth daily       triamcinolone (KENALOG) 0.1 % cream Apply  topically 2 times daily. 453.6 g 1     valACYclovir (VALTREX) 1000 mg tablet 2 TABLETS every 12 hours for two doses 12 tablet 6     zolpidem (AMBIEN) 5 MG tablet Take 1 tablet (5 mg) by mouth nightly as needed for sleep at bedtime. 90 tablet 1     FLAX SEED OIL 1000 MG OR CAPS 1 daily         Mammogram Screening: Patient under age 50, mutual decision reflected in health maintenance.      Pertinent mammograms are reviewed under the imaging tab.  Reviewed and updated as needed this visit by clinical staff  Tobacco  Allergies  Meds  Problems  Med Hx  Surg Hx  Fam Hx  Soc Hx          Reviewed and updated as needed this visit by Provider  Tobacco  Allergies  Meds  Problems  Med Hx  Surg Hx  Fam Hx            Review of Systems   Constitutional: Negative for chills and fever.   HENT: Negative for congestion, ear pain and hearing loss.    Eyes: Negative for pain.   Respiratory: Negative for cough.    Cardiovascular: Negative for chest pain and peripheral edema.   Gastrointestinal: Negative for abdominal pain, constipation, diarrhea, heartburn and hematochezia.   Genitourinary: Negative for frequency, genital sores and hematuria.   Musculoskeletal: Negative for arthralgias, joint " "swelling and myalgias.   Neurological: Negative for dizziness and headaches.   Psychiatric/Behavioral: Negative for mood changes. The patient is not nervous/anxious.           OBJECTIVE:   /76   Pulse 75   Temp 98.2  F (36.8  C) (Oral)   Resp 16   Ht 1.575 m (5' 2\")   Wt 56.7 kg (125 lb)   BMI 22.86 kg/m    Physical Exam  GENERAL: healthy, alert and no distress  EYES: Eyes grossly normal to inspection, PERRL and conjunctivae and sclerae normal  HENT: ear canals and TM's normal, nose and mouth without ulcers or lesions  NECK: no adenopathy, no asymmetry, masses, or scars and thyroid normal to palpation  RESP: lungs clear to auscultation - no rales, rhonchi or wheezes  BREAST: normal without masses, tenderness or nipple discharge and no palpable axillary masses or adenopathy  CV: regular rate and rhythm, normal S1 S2, no S3 or S4, no murmur, click or rub, no peripheral edema and peripheral pulses strong  ABDOMEN: soft, nontender, no hepatosplenomegaly, no masses and bowel sounds normal   (female): normal female external genitalia, normal urethral meatus, vaginal mucosa pink, moist, well rugated, and normal cervix/adnexa/uterus without masses or discharge  MS: no gross musculoskeletal defects noted, no edema  SKIN: no suspicious lesions or rashes  NEURO: Normal strength and tone, mentation intact and speech normal  PSYCH: mentation appears normal, affect normal/bright    Diagnostic Test Results:  Labs reviewed in Epic    ASSESSMENT/PLAN:   (Z00.00) Routine general medical examination at a health care facility  (primary encounter diagnosis)  Comment: see below  Plan: GLUCOSE, Pap imaged thin layer screen with HPV         - recommended age 30 - 65 years (select HPV         order below), HPV High Risk Types DNA Cervical,        Lipid panel reflex to direct LDL Fasting            (B00.1) Herpes labialis  Comment: stable  Plan: valACYclovir (VALTREX) 1000 mg tablet,         OFFICE/OUTPT VISIT,EST,LEVL III      " "  Refill x 1 yr     (G47.00) Insomnia, unspecified type  Comment: episodic use  Plan: zolpidem (AMBIEN) 5 MG tablet, OFFICE/OUTPT         VISIT,EST,LEVL III         Refill x 6 months, ok to refill until 2/2021    (L71.0) POD (perioral dermatitis)  Comment: controlled  Plan: clindamycin (CLINDAMAX) 1 % external gel,         OFFICE/OUTPT VISIT,EST,LEVL III        Refill x 1 yr     (Z12.31) Encounter for screening mammogram for breast cancer  Comment: due  Plan: [place] Screen Bilateral w/[place]            (Z12.11) Special screening for malignant neoplasms, colon  (Z80.0) Family history of colon cancer  Comment: due  Plan: GASTROENTEROLOGY ADULT REF PROCEDURE ONLY         Other; [place] [facility] [phone]              COUNSELING:  Reviewed preventive health counseling, as reflected in patient instructions  Special attention given to:        Regular exercise       Healthy diet/nutrition    Estimated body mass index is 22.86 kg/m  as calculated from the following:    Height as of this encounter: 1.575 m (5' 2\").    Weight as of this encounter: 56.7 kg (125 lb).         reports that she has never smoked. She has never used smokeless tobacco.      Counseling Resources:  ATP IV Guidelines  Pooled Cohorts Equation Calculator  Breast Cancer Risk Calculator  FRAX Risk Assessment  ICSI Preventive Guidelines  Dietary Guidelines for Americans, 2010  USDA's MyPlate  ASA Prophylaxis  Lung CA Screening    Bing Coughlin MD  [facility]  "

## 2020-02-13 ENCOUNTER — OFFICE VISIT (OUTPATIENT)
Dept: FAMILY MEDICINE | Facility: CLINIC | Age: 45
End: 2020-02-13
Payer: COMMERCIAL

## 2020-02-13 VITALS
HEIGHT: 62 IN | BODY MASS INDEX: 23 KG/M2 | SYSTOLIC BLOOD PRESSURE: 118 MMHG | DIASTOLIC BLOOD PRESSURE: 76 MMHG | WEIGHT: 125 LBS | HEART RATE: 75 BPM | RESPIRATION RATE: 16 BRPM | TEMPERATURE: 98.2 F

## 2020-02-13 DIAGNOSIS — Z12.31 ENCOUNTER FOR SCREENING MAMMOGRAM FOR BREAST CANCER: ICD-10-CM

## 2020-02-13 DIAGNOSIS — B00.1 HERPES LABIALIS: ICD-10-CM

## 2020-02-13 DIAGNOSIS — Z00.00 ROUTINE GENERAL MEDICAL EXAMINATION AT A HEALTH CARE FACILITY: Primary | ICD-10-CM

## 2020-02-13 DIAGNOSIS — G47.00 INSOMNIA, UNSPECIFIED TYPE: ICD-10-CM

## 2020-02-13 DIAGNOSIS — Z80.0 FAMILY HISTORY OF COLON CANCER: ICD-10-CM

## 2020-02-13 DIAGNOSIS — L71.0 POD (PERIORAL DERMATITIS): ICD-10-CM

## 2020-02-13 DIAGNOSIS — Z12.11 SPECIAL SCREENING FOR MALIGNANT NEOPLASMS, COLON: ICD-10-CM

## 2020-02-13 LAB
CHOLEST SERPL-MCNC: 197 MG/DL
GLUCOSE SERPL-MCNC: 90 MG/DL (ref 70–99)
HDLC SERPL-MCNC: 72 MG/DL
LDLC SERPL CALC-MCNC: 114 MG/DL
NONHDLC SERPL-MCNC: 125 MG/DL
TRIGL SERPL-MCNC: 56 MG/DL

## 2020-02-13 PROCEDURE — 2894A C OFFICE/OUTPT VISIT,EST,LEVL III: CPT | Mod: 25 | Performed by: FAMILY MEDICINE

## 2020-02-13 PROCEDURE — 90686 IIV4 VACC NO PRSV 0.5 ML IM: CPT | Performed by: FAMILY MEDICINE

## 2020-02-13 PROCEDURE — 90471 IMMUNIZATION ADMIN: CPT | Performed by: FAMILY MEDICINE

## 2020-02-13 PROCEDURE — 90715 TDAP VACCINE 7 YRS/> IM: CPT | Performed by: FAMILY MEDICINE

## 2020-02-13 PROCEDURE — 90472 IMMUNIZATION ADMIN EACH ADD: CPT | Performed by: FAMILY MEDICINE

## 2020-02-13 PROCEDURE — 82947 ASSAY GLUCOSE BLOOD QUANT: CPT | Performed by: FAMILY MEDICINE

## 2020-02-13 PROCEDURE — 80061 LIPID PANEL: CPT | Performed by: FAMILY MEDICINE

## 2020-02-13 PROCEDURE — 87624 HPV HI-RISK TYP POOLED RSLT: CPT | Performed by: FAMILY MEDICINE

## 2020-02-13 PROCEDURE — 99396 PREV VISIT EST AGE 40-64: CPT | Mod: 25 | Performed by: FAMILY MEDICINE

## 2020-02-13 PROCEDURE — 36415 COLL VENOUS BLD VENIPUNCTURE: CPT | Performed by: FAMILY MEDICINE

## 2020-02-13 PROCEDURE — G0145 SCR C/V CYTO,THINLAYER,RESCR: HCPCS | Performed by: FAMILY MEDICINE

## 2020-02-13 RX ORDER — CLINDAMYCIN PHOSPHATE 10 MG/G
GEL TOPICAL 2 TIMES DAILY
Qty: 60 G | Refills: 12 | Status: SHIPPED | OUTPATIENT
Start: 2020-02-13 | End: 2021-05-26

## 2020-02-13 RX ORDER — VALACYCLOVIR HYDROCHLORIDE 1 G/1
TABLET, FILM COATED ORAL
Qty: 12 TABLET | Refills: 6 | Status: SHIPPED | OUTPATIENT
Start: 2020-02-13 | End: 2021-05-26

## 2020-02-13 RX ORDER — ZOLPIDEM TARTRATE 5 MG/1
5 TABLET ORAL
Qty: 90 TABLET | Refills: 1 | Status: SHIPPED | OUTPATIENT
Start: 2020-02-13 | End: 2021-05-26

## 2020-02-13 ASSESSMENT — ENCOUNTER SYMPTOMS
COUGH: 0
DIARRHEA: 0
FEVER: 0
FREQUENCY: 0
ABDOMINAL PAIN: 0
CHILLS: 0
DIZZINESS: 0
CONSTIPATION: 0
EYE PAIN: 0
ARTHRALGIAS: 0
HEMATURIA: 0
HEARTBURN: 0
JOINT SWELLING: 0
HEADACHES: 0
NERVOUS/ANXIOUS: 0
HEMATOCHEZIA: 0
MYALGIAS: 0

## 2020-02-13 ASSESSMENT — MIFFLIN-ST. JEOR: SCORE: 1160.25

## 2020-02-17 LAB
COPATH REPORT: NORMAL
PAP: NORMAL

## 2020-02-19 LAB
FINAL DIAGNOSIS: NORMAL
HPV HR 12 DNA CVX QL NAA+PROBE: NEGATIVE
HPV16 DNA SPEC QL NAA+PROBE: NEGATIVE
HPV18 DNA SPEC QL NAA+PROBE: NEGATIVE
SPECIMEN DESCRIPTION: NORMAL
SPECIMEN SOURCE CVX/VAG CYTO: NORMAL

## 2020-03-04 ENCOUNTER — TRANSFERRED RECORDS (OUTPATIENT)
Dept: HEALTH INFORMATION MANAGEMENT | Facility: CLINIC | Age: 45
End: 2020-03-04

## 2020-03-10 ENCOUNTER — TRANSFERRED RECORDS (OUTPATIENT)
Dept: HEALTH INFORMATION MANAGEMENT | Facility: CLINIC | Age: 45
End: 2020-03-10

## 2020-12-12 ENCOUNTER — HEALTH MAINTENANCE LETTER (OUTPATIENT)
Age: 45
End: 2020-12-12

## 2021-04-11 ENCOUNTER — HEALTH MAINTENANCE LETTER (OUTPATIENT)
Age: 46
End: 2021-04-11

## 2021-05-25 ASSESSMENT — ENCOUNTER SYMPTOMS
CHILLS: 0
EYE PAIN: 0
FEVER: 0
PALPITATIONS: 0
DYSURIA: 0
HEARTBURN: 0
NERVOUS/ANXIOUS: 0
FREQUENCY: 0
SHORTNESS OF BREATH: 0
HEMATOCHEZIA: 0
DIZZINESS: 0
BREAST MASS: 0
JOINT SWELLING: 0
WEAKNESS: 0
HEADACHES: 0
DIARRHEA: 0
ABDOMINAL PAIN: 0
ARTHRALGIAS: 0
COUGH: 0
PARESTHESIAS: 0
CONSTIPATION: 0
NAUSEA: 0
SORE THROAT: 0
HEMATURIA: 0
MYALGIAS: 0

## 2021-05-26 ENCOUNTER — OFFICE VISIT (OUTPATIENT)
Dept: FAMILY MEDICINE | Facility: CLINIC | Age: 46
End: 2021-05-26
Payer: COMMERCIAL

## 2021-05-26 VITALS
WEIGHT: 131 LBS | BODY MASS INDEX: 24.11 KG/M2 | DIASTOLIC BLOOD PRESSURE: 80 MMHG | SYSTOLIC BLOOD PRESSURE: 127 MMHG | HEIGHT: 62 IN | TEMPERATURE: 97.6 F | HEART RATE: 73 BPM | OXYGEN SATURATION: 100 %

## 2021-05-26 DIAGNOSIS — S99.921A INJURY OF RIGHT FOOT, INITIAL ENCOUNTER: ICD-10-CM

## 2021-05-26 DIAGNOSIS — Z13.1 SCREENING FOR DIABETES MELLITUS: ICD-10-CM

## 2021-05-26 DIAGNOSIS — Z11.59 NEED FOR HEPATITIS C SCREENING TEST: ICD-10-CM

## 2021-05-26 DIAGNOSIS — L71.0 POD (PERIORAL DERMATITIS): ICD-10-CM

## 2021-05-26 DIAGNOSIS — G47.00 INSOMNIA, UNSPECIFIED TYPE: ICD-10-CM

## 2021-05-26 DIAGNOSIS — Z00.00 ROUTINE GENERAL MEDICAL EXAMINATION AT A HEALTH CARE FACILITY: Primary | ICD-10-CM

## 2021-05-26 DIAGNOSIS — Z86.16 HISTORY OF COVID-19: ICD-10-CM

## 2021-05-26 DIAGNOSIS — B00.1 HERPES LABIALIS: ICD-10-CM

## 2021-05-26 DIAGNOSIS — Z13.220 LIPID SCREENING: ICD-10-CM

## 2021-05-26 LAB
CHOLEST SERPL-MCNC: 175 MG/DL
GLUCOSE SERPL-MCNC: 91 MG/DL (ref 70–99)
HDLC SERPL-MCNC: 59 MG/DL
LDLC SERPL CALC-MCNC: 105 MG/DL
NONHDLC SERPL-MCNC: 116 MG/DL
TRIGL SERPL-MCNC: 57 MG/DL

## 2021-05-26 PROCEDURE — 36415 COLL VENOUS BLD VENIPUNCTURE: CPT | Performed by: PHYSICIAN ASSISTANT

## 2021-05-26 PROCEDURE — 99214 OFFICE O/P EST MOD 30 MIN: CPT | Mod: 25 | Performed by: PHYSICIAN ASSISTANT

## 2021-05-26 PROCEDURE — 86803 HEPATITIS C AB TEST: CPT | Performed by: PHYSICIAN ASSISTANT

## 2021-05-26 PROCEDURE — 80061 LIPID PANEL: CPT | Performed by: PHYSICIAN ASSISTANT

## 2021-05-26 PROCEDURE — 99396 PREV VISIT EST AGE 40-64: CPT | Performed by: PHYSICIAN ASSISTANT

## 2021-05-26 PROCEDURE — 82947 ASSAY GLUCOSE BLOOD QUANT: CPT | Performed by: PHYSICIAN ASSISTANT

## 2021-05-26 RX ORDER — CLINDAMYCIN PHOSPHATE 10 MG/G
GEL TOPICAL 2 TIMES DAILY
Qty: 60 G | Refills: 12 | Status: SHIPPED | OUTPATIENT
Start: 2021-05-26 | End: 2022-06-02

## 2021-05-26 RX ORDER — VALACYCLOVIR HYDROCHLORIDE 1 G/1
TABLET, FILM COATED ORAL
Qty: 12 TABLET | Refills: 6 | Status: SHIPPED | OUTPATIENT
Start: 2021-05-26 | End: 2022-06-02

## 2021-05-26 RX ORDER — ZOLPIDEM TARTRATE 5 MG/1
5 TABLET ORAL
Qty: 90 TABLET | Refills: 1 | Status: SHIPPED | OUTPATIENT
Start: 2021-05-26 | End: 2022-06-02

## 2021-05-26 ASSESSMENT — ENCOUNTER SYMPTOMS
HEMATOCHEZIA: 0
CONSTIPATION: 0
SHORTNESS OF BREATH: 0
HEMATURIA: 0
NAUSEA: 0
DYSURIA: 0
DIZZINESS: 0
ABDOMINAL PAIN: 0
BREAST MASS: 0
WEAKNESS: 0
JOINT SWELLING: 0
HEADACHES: 0
PALPITATIONS: 0
FREQUENCY: 0
NERVOUS/ANXIOUS: 0
MYALGIAS: 0
SORE THROAT: 0
ARTHRALGIAS: 0
CHILLS: 0
FEVER: 0
DIARRHEA: 0
COUGH: 0
EYE PAIN: 0
HEARTBURN: 0
PARESTHESIAS: 0

## 2021-05-26 ASSESSMENT — MIFFLIN-ST. JEOR: SCORE: 1174.52

## 2021-05-26 ASSESSMENT — PAIN SCALES - GENERAL: PAINLEVEL: NO PAIN (0)

## 2021-05-26 NOTE — NURSING NOTE
"Chief Complaint   Patient presents with     Physical       Initial /80   Pulse 73   Temp 97.6  F (36.4  C) (Tympanic)   Ht 1.562 m (5' 1.5\")   Wt 59.4 kg (131 lb)   LMP 05/02/2021   SpO2 100%   BMI 24.35 kg/m   Estimated body mass index is 24.35 kg/m  as calculated from the following:    Height as of this encounter: 1.562 m (5' 1.5\").    Weight as of this encounter: 59.4 kg (131 lb).  Medication Reconciliation: complete    DELIA Santacruz MA    "

## 2021-05-26 NOTE — PROGRESS NOTES
SUBJECTIVE:   CC: Megan Kearney is an 45 year old woman who presents for preventive health visit.       Patient has been advised of split billing requirements and indicates understanding: Yes  Healthy Habits:     Getting at least 3 servings of Calcium per day:  Yes    Bi-annual eye exam:  Yes    Dental care twice a year:  Yes    Sleep apnea or symptoms of sleep apnea:  None    Diet:  Regular (no restrictions)    Frequency of exercise:  6-7 days/week    Duration of exercise:  30-45 minutes    Taking medications regularly:  Yes    Medication side effects:  Not applicable    PHQ-2 Total Score: 0    Additional concerns today:  Yes        Derm concern on right leg, check right breast and injured right 5th toe. Patient is aware of split billing.     PROBLEMS TO ADD...  1. Insomnia: she has taken ambien 5 mg for years. She does not typically need this every night. No concerns with side effects.   2. Herpes labialis: managed with valtrex for outbreaks.   3. Perioral dermatitis: managed with clindamycin topically.   4. Injujry right foot: she ran into a chair and the leg of the chair went between the 4th and 5th toes. She has bruising and swelling. Hard time wearing a closed toe shoe.   5. Changes in skin: small area on her right breast and on the right leg.     Today's PHQ-2 Score:   PHQ-2 ( 1999 Pfizer) 5/25/2021   Q1: Little interest or pleasure in doing things 0   Q2: Feeling down, depressed or hopeless 0   PHQ-2 Score 0   Q1: Little interest or pleasure in doing things Not at all   Q2: Feeling down, depressed or hopeless Not at all   PHQ-2 Score 0       Abuse: Current or Past (Physical, Sexual or Emotional) - No  Do you feel safe in your environment? Yes    Have you ever done Advance Care Planning? (For example, a Health Directive, POLST, or a discussion with a medical provider or your loved ones about your wishes): Yes, patient states has an Advance Care Planning document and will bring a copy to the  clinic.    Social History     Tobacco Use     Smoking status: Never Smoker     Smokeless tobacco: Never Used     Tobacco comment: Nonsmoking household   Substance Use Topics     Alcohol use: No     Alcohol/week: 0.0 standard drinks     If you drink alcohol do you typically have >3 drinks per day or >7 drinks per week? No    Alcohol Use 5/25/2021   Prescreen: >3 drinks/day or >7 drinks/week? Not Applicable   Prescreen: >3 drinks/day or >7 drinks/week? -       Reviewed orders with patient.  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     Hx of LASIK     MMT (medial meniscus tear)     Psoriasis     Herpes labialis     Past Surgical History:   Procedure Laterality Date     ARTHROSCOPY KNEE Right 5/27/2015    Procedure: ARTHROSCOPY KNEE;  Surgeon: Dima Haile MD;  Location: [facility]     COLONOSCOPY  3/2020     D & C       HC HYSTEROS W PERMANENT FALLOPAIN IMPLANT      essure     LASIK      2008       Social History     Tobacco Use     Smoking status: Never Smoker     Smokeless tobacco: Never Used     Tobacco comment: Nonsmoking household   Substance Use Topics     Alcohol use: No     Alcohol/week: 0.0 standard drinks     Family History   Problem Relation Age of Onset     Cancer Paternal Grandmother 50        started as colon, mets to liver     Diabetes Paternal Grandmother      Colon Cancer Paternal Grandmother         passed away in 1983 from cancer     Hypertension Mother       "Thyroid Disease Mother      Hypertension Maternal Grandmother      Hypertension Maternal Grandfather      Diabetes Maternal Grandfather      Gastrointestinal Disease Sister         colitis     Gastrointestinal Disease Maternal Uncle         crohns     Glaucoma No family hx of      Macular Degeneration No family hx of          Current Outpatient Medications   Medication Sig Dispense Refill     CALCIUM + D 600-200 MG-UNIT OR TABS 1 TABLET DAILY       carboxymethylcellul-glycerin (OPTIVE) 0.5-0.9 % SOLN Apply 1 drop to eye 3 times daily. \"Refresh Optive artificial tears\"       clindamycin (CLINDAMAX) 1 % external gel Apply topically 2 times daily Profile RX - do not fill until pt requests or is due for refill. 60 g 12     fluticasone (FLONASE) 50 MCG/ACT nasal spray Spray 2 sprays into both nostrils daily 48 g 3     LUTEIN PO Take by mouth daily       MULTIVITAMIN TABS   OR 1 TABLET DAILY       mupirocin (BACTROBAN) 2 % cream Apply topically 3 times daily 30 g 3     triamcinolone (KENALOG) 0.1 % cream Apply  topically 2 times daily. 453.6 g 1     valACYclovir (VALTREX) 1000 mg tablet 2 TABLETS every 12 hours for two doses 12 tablet 6     zolpidem (AMBIEN) 5 MG tablet Take 1 tablet (5 mg) by mouth nightly as needed for sleep at bedtime. 90 tablet 1     No Known Allergies  Recent Labs   Lab Test 05/26/21  1019 02/13/20  1014 07/31/15  0804   LDL PENDING 114* 100   HDL 59 72 56   TRIG PENDING 56 53        Breast Cancer Screening:    Breast CA Risk Assessment (FHS-7) 5/25/2021   Do you have a family history of breast, colon, or ovarian cancer? No / Unknown         Mammogram Screening: Recommended annual mammography  Pertinent mammograms are reviewed under the imaging tab.    History of abnormal Pap smear:   NO - age 30-65 PAP every 5 years with negative HPV co-testing recommended  Last 3 Pap and HPV Results:   PAP / HPV Latest Ref Rng & Units 2/13/2020 8/7/2015 9/25/2012   PAP - NIL NIL NIL   HPV 16 DNA NEG:Negative " Negative - -   HPV 18 DNA NEG:Negative Negative - -   OTHER HR HPV NEG:Negative Negative - -     PAP / HPV Latest Ref Rng & Units 2/13/2020 8/7/2015 9/25/2012   PAP - NIL NIL NIL   HPV 16 DNA NEG:Negative Negative - -   HPV 18 DNA NEG:Negative Negative - -   OTHER HR HPV NEG:Negative Negative - -     Reviewed and updated as needed this visit by clinical staff  Tobacco  Allergies  Meds   Med Hx  Surg Hx  Fam Hx  Soc Hx        Reviewed and updated as needed this visit by Provider                Past Medical History:   Diagnosis Date     Tear of medial cartilage or meniscus of knee, current 3/31/15    right      Past Surgical History:   Procedure Laterality Date     ARTHROSCOPY KNEE Right 5/27/2015    Procedure: ARTHROSCOPY KNEE;  Surgeon: Dima Haile MD;  Location: [facility]     COLONOSCOPY  3/2020     D & C       HC HYSTEROS W PERMANENT FALLOPAIN IMPLANT      essure     LASIK      2008     OB History   No obstetric history on file.       Review of Systems   Constitutional: Negative for chills and fever.   HENT: Negative for congestion, ear pain, hearing loss and sore throat.    Eyes: Negative for pain and visual disturbance.   Respiratory: Negative for cough and shortness of breath.    Cardiovascular: Negative for chest pain, palpitations and peripheral edema.   Gastrointestinal: Negative for abdominal pain, constipation, diarrhea, heartburn, hematochezia and nausea.   Breasts:  Negative for tenderness, breast mass and discharge.   Genitourinary: Negative for dysuria, frequency, genital sores, hematuria, pelvic pain, urgency, vaginal bleeding and vaginal discharge.   Musculoskeletal: Negative for arthralgias, joint swelling and myalgias.   Skin: Negative for rash.   Neurological: Negative for dizziness, weakness, headaches and paresthesias.   Psychiatric/Behavioral: Negative for mood changes. The patient is not nervous/anxious.      Concerns or other positive ROS listed above     OBJECTIVE:   /80    "Pulse 73   Temp 97.6  F (36.4  C) (Tympanic)   Ht 1.562 m (5' 1.5\")   Wt 59.4 kg (131 lb)   LMP 05/02/2021   SpO2 100%   BMI 24.35 kg/m    Physical Exam  GENERAL: healthy, alert and no distress  EYES: Eyes grossly normal to inspection, PERRL and conjunctivae and sclerae normal  HENT: ear canals and TM's normal, nose and mouth without ulcers or lesions  NECK: no adenopathy, no asymmetry, masses, or scars and thyroid normal to palpation  RESP: lungs clear to auscultation - no rales, rhonchi or wheezes  BREAST: normal without masses, tenderness or nipple discharge and no palpable axillary masses or adenopathy  There is a small skin tag on the right breast.   CV: regular rate and rhythm, normal S1 S2, no S3 or S4, no murmur, click or rub, no peripheral edema and peripheral pulses strong  ABDOMEN: soft, nontender, no hepatosplenomegaly, no masses and bowel sounds normal  MS: right foot: there is swelling and bruising in the 4th and 5th toes, she is able to flex and extend toes. No open skin.   SKIN: small scar on the anterior right leg. No abnormal skin lesions.   NEURO: Normal strength and tone, mentation intact and speech normal  PSYCH: mentation appears normal, affect normal/bright        ASSESSMENT/PLAN:   1. Routine general medical examination at a health care facility  Health maintenance reviewed and updated.    2. Herpes labialis  Stable, refills given  - valACYclovir (VALTREX) 1000 mg tablet; 2 TABLETS every 12 hours for two doses  Dispense: 12 tablet; Refill: 6    3. POD (perioral dermatitis)  Stable, refills given  - clindamycin (CLINDAMAX) 1 % external gel; Apply topically 2 times daily Profile RX - do not fill until pt requests or is due for refill.  Dispense: 60 g; Refill: 12    4. Insomnia, unspecified type  Stable, refills given  - zolpidem (AMBIEN) 5 MG tablet; Take 1 tablet (5 mg) by mouth nightly as needed for sleep at bedtime.  Dispense: 90 tablet; Refill: 1    5. Injury of right foot, initial " "encounter  Declines Xray today.   Discussed symptomatic treatments. Morro taping toes for support. Wearing wide based and supportive shoes. Return if symptoms persist.     6. Need for hepatitis C screening test  - Hepatitis C Screen Reflex to HCV RNA Quant and Genotype    7. Screening for diabetes mellitus  - Glucose    8. Lipid screening  - Lipid panel reflex to direct LDL Fasting    9. History of COVID 19   She also mentions that she had COVID in January and continues to have loss of sense of smell and taste, she welcomed a referral to the Post COVID clinic.     Patient has been advised of split billing requirements and indicates understanding: Yes  COUNSELING:  Reviewed preventive health counseling, as reflected in patient instructions       Regular exercise       Healthy diet/nutrition       Consider Hep C screening for all patients one time for ages 18-79 years    Estimated body mass index is 24.35 kg/m  as calculated from the following:    Height as of this encounter: 1.562 m (5' 1.5\").    Weight as of this encounter: 59.4 kg (131 lb).        She reports that she has never smoked. She has never used smokeless tobacco.      Counseling Resources:  ATP IV Guidelines  Pooled Cohorts Equation Calculator  Breast Cancer Risk Calculator  BRCA-Related Cancer Risk Assessment: FHS-7 Tool  FRAX Risk Assessment  ICSI Preventive Guidelines  Dietary Guidelines for Americans, 2010  USDA's MyPlate  ASA Prophylaxis  Lung CA Screening    Kristen M. Kehr, PA-C [facility]  "

## 2021-05-27 ENCOUNTER — MYC MEDICAL ADVICE (OUTPATIENT)
Dept: FAMILY MEDICINE | Facility: CLINIC | Age: 46
End: 2021-05-27

## 2021-05-27 LAB — HCV AB SERPL QL IA: NONREACTIVE

## 2021-05-27 NOTE — TELEPHONE ENCOUNTER
Contacted the patient and informed her her note was completed.She stated she already printed it off [employer] and will not need the copy from the provider.  Danielle Romero

## 2021-05-27 NOTE — TELEPHONE ENCOUNTER
:    Please contact this patient when letter is ready for her to .     Thank you.   Kristen Kehr PA-C

## 2021-05-27 NOTE — LETTER
[facility]  [address] [facility] [address]  Phone: [phone]    May 27, 2021        Megan Kearney  [address] [facility] [address]          To whom it may concern:    RE: Megan Kearney    Patient was seen in the clinic 5/26 for a foot injury.     Please contact me for questions or concerns.      Sincerely,        Kristen M. Kehr, PA-C

## 2021-06-04 NOTE — PROGRESS NOTES
Assessment & Plan     Injury of toe on right foot, subsequent encounter  Fracture on xray, radiology confirmed  Has had for two weeks  No insurance, post-op boot would be very expensive we discussed this today and will wait until f/u done  I have messaged surgeon to see if patient should be seen  I will notify patient tomorrow with f/u  For now she will rest, note given for work  - XR Toe Right G/E 2 Views    Closed displaced fracture of proximal phalanx of lesser toe of right foot with routine healing, subsequent encounter    - Orthopedic & Spine  Referral; Future        No follow-ups on file.    CASSIE [facility]   Haylie is a 45 year old who presents for the following health issues  accompanied by her Self:    HPI       New patient to me:              Musculoskeletal problem/pain  Onset/Duration: x 2 weeks  Description  Location: foot - right  Joint Swelling: YES  Redness: YES  Pain: YES  Warmth: no  Intensity:  moderate  Progression of Symptoms:  same  Accompanying signs and symptoms:   Fevers: no  Numbness/tingling/weakness: YES  History  Trauma to the area: YES- Ran into a corner of the chair per pt  Recent illness:  no  Previous similar problem: no  Previous evaluation:  no  Precipitating or alleviating factors:  Aggravating factors include: standing, walking and overuse  Therapies tried and outcome: rest/inactivity      Works in [employer]. Has paperwork for this as she has not been able to work. See scanned workability letter.     Did discuss at her recent physical with Kehr. Was hoping it would get better, it has not.     The chair went between her 5th and 4th toe and her pinkie toe moved sideways and caused a lot of pain per patient.     Had swelling and bruising right away. Hurts to walk on it. Can't be on her feet for a long time. Swelling is better. Not really any better.         Review of Systems   Constitutional, HEENT,  cardiovascular, pulmonary, GI, , musculoskeletal, neuro, skin, endocrine and psych systems are negative, except as otherwise noted.      Objective    /85   Pulse 85   Temp 97.8  F (36.6  C) (Tympanic)   Resp 14   Wt 61.2 kg (135 lb)   SpO2 100%   Breastfeeding No   BMI 25.09 kg/m    Body mass index is 25.09 kg/m .  Physical Exam   GENERAL: healthy, alert and no distress  RESP: {:non-labored  CV: {:RRR  MS: {:R pinkie toe-some edema and ecchymosis. Some weakness with flexion when compared to left foot pinkie toe. Distal sensation intact. Temperature appropriate. Pedal pulse normal. Tender over pinkie toe in general on left side. Not tender over foot or ankle.   SKIN: no suspicious lesions or rashes  NEURO: Normal strength and tone, mentation intact and speech normal  PSYCH: mentation appears normal, affect normal/bright        Study Result    TOE RIGHT TWO OR MORE VIEWS   6/7/2021 9:11 AM      HISTORY:  Right toe injury 2 weeks ago, pain in pinkie toe. Injury of  toe on right foot, subsequent encounter.                                                                      IMPRESSION: Longitudinal fracture of the fifth proximal phalanx with 2  mm of dorsal displacement.

## 2021-06-07 ENCOUNTER — ANCILLARY PROCEDURE (OUTPATIENT)
Dept: GENERAL RADIOLOGY | Facility: CLINIC | Age: 46
End: 2021-06-07
Attending: PHYSICIAN ASSISTANT
Payer: COMMERCIAL

## 2021-06-07 ENCOUNTER — TELEPHONE (OUTPATIENT)
Dept: PODIATRY | Facility: CLINIC | Age: 46
End: 2021-06-07

## 2021-06-07 ENCOUNTER — OFFICE VISIT (OUTPATIENT)
Dept: FAMILY MEDICINE | Facility: CLINIC | Age: 46
End: 2021-06-07
Payer: COMMERCIAL

## 2021-06-07 VITALS
OXYGEN SATURATION: 100 % | BODY MASS INDEX: 25.09 KG/M2 | SYSTOLIC BLOOD PRESSURE: 122 MMHG | DIASTOLIC BLOOD PRESSURE: 85 MMHG | HEART RATE: 85 BPM | TEMPERATURE: 97.8 F | RESPIRATION RATE: 14 BRPM | WEIGHT: 135 LBS

## 2021-06-07 DIAGNOSIS — S92.511D CLOSED DISPLACED FRACTURE OF PROXIMAL PHALANX OF LESSER TOE OF RIGHT FOOT WITH ROUTINE HEALING, SUBSEQUENT ENCOUNTER: ICD-10-CM

## 2021-06-07 DIAGNOSIS — S99.921D INJURY OF TOE ON RIGHT FOOT, SUBSEQUENT ENCOUNTER: Primary | ICD-10-CM

## 2021-06-07 PROCEDURE — 99214 OFFICE O/P EST MOD 30 MIN: CPT | Performed by: PHYSICIAN ASSISTANT

## 2021-06-07 PROCEDURE — 73660 X-RAY EXAM OF TOE(S): CPT | Mod: RT | Performed by: RADIOLOGY

## 2021-06-07 NOTE — TELEPHONE ENCOUNTER
Patient has a podiatry order for closed displaced fx of proximal phalax of lesser toe of right foot.  Scheduled patient for 6/29/21 with  at [facility].  Pt prefers [facility].  Please triage to make sure patient can wait until 6/29/21. Patient prefers to see

## 2021-06-07 NOTE — LETTER
June 8, 2021      Megan REBECA Christel  [address] [facility] [address]        Dear ,    We are writing to inform you of your test results.        Resulted Orders   XR Toe Right G/E 2 Views    Narrative    TOE RIGHT TWO OR MORE VIEWS   6/7/2021 9:11 AM     HISTORY:  Right toe injury 2 weeks ago, pain in pinkie toe. Injury of  toe on right foot, subsequent encounter.      Impression    IMPRESSION: Longitudinal fracture of the fifth proximal phalanx with 2  mm of dorsal displacement.    MARKIE ARANA MD       If you have any questions or concerns, please call the clinic at the number listed above.       Sincerely,      Sheri Whitehead PA-C

## 2021-06-08 NOTE — RESULT ENCOUNTER NOTE
PLEASE CALL PATIENT:       Fracture as we discussed in clinic. I heard back from podiatry, it sounds like he could tape it in such a way to help it heal and recommends a CAM walker (boot) to help reduce stress on the location.  He doesn't think it looks like it would need surgery so that is good. We have cam walkers here but with her cost concern it likely is much cheaper to purchase over the counter (amazon, [employer], etc) or if a friend has one she could even borrow.  They are walking boots.     Have her let me know what her plan is, Sheri

## 2021-06-08 NOTE — TELEPHONE ENCOUNTER
Would pt be okay to wait till next week for a lidia appt? I was looking at [phone]..    Laverne GALLARDO RN Specialty Triage 6/8/2021 10:29 AM

## 2021-06-15 ENCOUNTER — OFFICE VISIT (OUTPATIENT)
Dept: PODIATRY | Facility: CLINIC | Age: 46
End: 2021-06-15
Payer: COMMERCIAL

## 2021-06-15 VITALS — HEART RATE: 84 BPM | SYSTOLIC BLOOD PRESSURE: 114 MMHG | DIASTOLIC BLOOD PRESSURE: 74 MMHG

## 2021-06-15 DIAGNOSIS — S92.511D CLOSED DISPLACED FRACTURE OF PROXIMAL PHALANX OF LESSER TOE OF RIGHT FOOT WITH ROUTINE HEALING, SUBSEQUENT ENCOUNTER: ICD-10-CM

## 2021-06-15 PROCEDURE — 99203 OFFICE O/P NEW LOW 30 MIN: CPT | Performed by: PODIATRIST

## 2021-06-15 NOTE — LETTER
"    6/15/2021         RE: Megan Kearney  [address] [facility] [address]        Dear Colleague,    Thank you for referring your patient, Megan Kearney, to the [facility]. Please see a copy of my visit note below.    S:  Patient seen today in consult from Sheri Whitehead and complains of pain in the right fifth toe.  Had blunt trauma 5/21/21.  Hit toe on stool with sudden dorsiflexion.  Had pain and bruising immediately afterwards.   Patient had x-ray 1 week ago and was given walking boot.  She works as a caterer.  She states that her toes feeling much better and has almost no pain now.  States that edema is decreasing.    ROS:  A 10-point review of systems was performed and is positive for that noted in the HPI and as seen above.  All other areas are negative.        No Known Allergies    Current Outpatient Medications   Medication Sig Dispense Refill     CALCIUM + D 600-200 MG-UNIT OR TABS 1 TABLET DAILY       carboxymethylcellul-glycerin (OPTIVE) 0.5-0.9 % SOLN Apply 1 drop to eye 3 times daily. \"Refresh Optive artificial tears\"       clindamycin (CLINDAMAX) 1 % external gel Apply topically 2 times daily Profile RX - do not fill until pt requests or is due for refill. 60 g 12     fluticasone (FLONASE) 50 MCG/ACT nasal spray Spray 2 sprays into both nostrils daily 48 g 3     LUTEIN PO Take by mouth daily       MULTIVITAMIN TABS   OR 1 TABLET DAILY       mupirocin (BACTROBAN) 2 % cream Apply topically 3 times daily 30 g 3     triamcinolone (KENALOG) 0.1 % cream Apply  topically 2 times daily. 453.6 g 1     valACYclovir (VALTREX) 1000 mg tablet 2 TABLETS every 12 hours for two doses 12 tablet 6     zolpidem (AMBIEN) 5 MG tablet Take 1 tablet (5 mg) by mouth nightly as needed for sleep at bedtime. 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     Hx of LASIK     MMT (medial meniscus tear)     Psoriasis     Herpes labialis       Past Medical History:   Diagnosis Date     Tear of medial cartilage or meniscus of knee, current 3/31/15    right       Past Surgical History:   Procedure Laterality Date     ARTHROSCOPY KNEE Right 5/27/2015    Procedure: ARTHROSCOPY KNEE;  Surgeon: Dima Haile MD;  Location: [facility]     COLONOSCOPY  3/2020     D & C       HC HYSTEROS W PERMANENT FALLOPAIN IMPLANT      essure     LASIK      2008       Family History   Problem Relation Age of Onset     Cancer Paternal Grandmother 50        started as colon, mets to liver     Diabetes Paternal Grandmother      Colon Cancer Paternal Grandmother         passed away in 1983 from cancer     Hypertension Mother      Thyroid Disease Mother      Hypertension Maternal Grandmother      Hypertension Maternal Grandfather      Diabetes Maternal Grandfather      Gastrointestinal Disease Sister         colitis     Gastrointestinal Disease Maternal Uncle         crohns     Glaucoma No family hx of      Macular Degeneration No family hx of        Social History     Tobacco Use     Smoking status: Never Smoker     Smokeless tobacco: Never Used     Tobacco comment: Nonsmoking household   Substance Use Topics     Alcohol use: No     Alcohol/week: 0.0 standard drinks         Exam:    Vitals: /74   Pulse 84   BMI: There is no height or weight on file to calculate BMI.  Height: Data Unavailable    Constitutional/ general:  Pt is in no apparent distress, appears well-nourished.  Cooperative with history and physical exam.     Psych:  The patient answered questions appropriately.  Normal affect.  Seems to have reasonable expectations, in terms of treatment.     Eyes:  Visual scanning/ tracking without deficit.     Ears:  Response to auditory stimuli is  normal.  negative hearing aid devices.  Auricles in proper alignment.     Lymphatic:  Popliteal lymph nodes not enlarged.     Lungs:  Non labored breathing, non labored speech. No cough.  No audible wheezing. Even, quiet breathing.       Vascular:  positive pedal pulses bilaterally for both the DP and PT arteries.  CFT < 3 sec.  negative ankle edema.  positive pedal hair growth.    Neuro:  Alert and oriented x 3. Coordinated gait.  Light touch sensation is intact to the L4, L5, S1 distributions. No obvious deficits.  No evidence of neurological-based weakness, spasticity, or contracture in the lower extremities.      Derm: Normal texture and turgor.  No erythema, ecchymosis, or cyanosis.      Musculoskeletal:     Patient is ambulatory without an assistive device or brace.     Normal arch with weightbearing.  No forefoot or rear foot deformities noted.  MS 5/5 all compartments.  Normal ROM all fore foot and rearfoot joints.  No equinus.  right fifth toe is edematous.  Toe rectus.  virtually no painful on palpation.      Radiographic Exam:  X-Ray Findings:  I personally reviewed the films.    TOE RIGHT TWO OR MORE VIEWS   6/7/2021 9:11 AM      HISTORY:  Right toe injury 2 weeks ago, pain in pinkie toe. Injury of  toe on right foot, subsequent encounter.                                                                      IMPRESSION: Longitudinal fracture of the fifth proximal phalanx with 2  mm of dorsal displacement.      A: Toe fracture    P: Xrays from past personally reviewed.  Discussed with patient that since not significantly malaligned will watch for now and give more time to heal.  Patient continue to wear walking boot or stiff shoes to keep this offloaded for the next month.  She will slowly increase activities as tolerated.  Discussed this toe may always be more edematous in the contralateral toe.  We will have her return to work on 6/28/2021.  Return to clinic prn.  Thank you for allowing me participate in  the care of this patient.        Benoit Grijalva DPM, FACFAS             Again, thank you for allowing me to participate in the care of your patient.        Sincerely,        Benoit Grijalva DPM

## 2021-06-15 NOTE — LETTER
[facility]  [address] [facility] [address]  [facility] [address]  Phone: [phone]  Fax: [phone]    Jodi 15, 2021        Megan Kearney  [address] [place] [address]          To whom it may concern:    RE: Megan Riccimilagro    Patient was seen and treated today at our clinic.  Patient may return to work 6/28/21 with the following:  No working or lifting restrictions      Please contact me for questions or concerns.      Sincerely,      Dr. Benoit Grijalva [email or web address] FAC FAS/lld

## 2021-06-15 NOTE — PROGRESS NOTES
"S:  Patient seen today in consult from Sheri Whitehead and complains of pain in the right fifth toe.  Had blunt trauma 5/21/21.  Hit toe on stool with sudden dorsiflexion.  Had pain and bruising immediately afterwards.   Patient had x-ray 1 week ago and was given walking boot.  She works as a caterer.  She states that her toes feeling much better and has almost no pain now.  States that edema is decreasing.    ROS:  A 10-point review of systems was performed and is positive for that noted in the HPI and as seen above.  All other areas are negative.        No Known Allergies    Current Outpatient Medications   Medication Sig Dispense Refill     CALCIUM + D 600-200 MG-UNIT OR TABS 1 TABLET DAILY       carboxymethylcellul-glycerin (OPTIVE) 0.5-0.9 % SOLN Apply 1 drop to eye 3 times daily. \"Refresh Optive artificial tears\"       clindamycin (CLINDAMAX) 1 % external gel Apply topically 2 times daily Profile RX - do not fill until pt requests or is due for refill. 60 g 12     fluticasone (FLONASE) 50 MCG/ACT nasal spray Spray 2 sprays into both nostrils daily 48 g 3     LUTEIN PO Take by mouth daily       MULTIVITAMIN TABS   OR 1 TABLET DAILY       mupirocin (BACTROBAN) 2 % cream Apply topically 3 times daily 30 g 3     triamcinolone (KENALOG) 0.1 % cream Apply  topically 2 times daily. 453.6 g 1     valACYclovir (VALTREX) 1000 mg tablet 2 TABLETS every 12 hours for two doses 12 tablet 6     zolpidem (AMBIEN) 5 MG tablet Take 1 tablet (5 mg) by mouth nightly as needed for sleep at bedtime. 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     Hx of LASIK     MMT (medial meniscus tear)     Psoriasis     Herpes labialis       Past Medical History:   Diagnosis Date     " Tear of medial cartilage or meniscus of knee, current 3/31/15    right       Past Surgical History:   Procedure Laterality Date     ARTHROSCOPY KNEE Right 5/27/2015    Procedure: ARTHROSCOPY KNEE;  Surgeon: Dima Haile MD;  Location: [facility] OR     COLONOSCOPY  3/2020     D & C       HC HYSTEROS W PERMANENT FALLOPAIN IMPLANT      essure     LASIK      2008       Family History   Problem Relation Age of Onset     Cancer Paternal Grandmother 50        started as colon, mets to liver     Diabetes Paternal Grandmother      Colon Cancer Paternal Grandmother         passed away in 1983 from cancer     Hypertension Mother      Thyroid Disease Mother      Hypertension Maternal Grandmother      Hypertension Maternal Grandfather      Diabetes Maternal Grandfather      Gastrointestinal Disease Sister         colitis     Gastrointestinal Disease Maternal Uncle         crohns     Glaucoma No family hx of      Macular Degeneration No family hx of        Social History     Tobacco Use     Smoking status: Never Smoker     Smokeless tobacco: Never Used     Tobacco comment: Nonsmoking household   Substance Use Topics     Alcohol use: No     Alcohol/week: 0.0 standard drinks         Exam:    Vitals: /74   Pulse 84   BMI: There is no height or weight on file to calculate BMI.  Height: Data Unavailable    Constitutional/ general:  Pt is in no apparent distress, appears well-nourished.  Cooperative with history and physical exam.     Psych:  The patient answered questions appropriately.  Normal affect.  Seems to have reasonable expectations, in terms of treatment.     Eyes:  Visual scanning/ tracking without deficit.     Ears:  Response to auditory stimuli is normal.  negative hearing aid devices.  Auricles in proper alignment.     Lymphatic:  Popliteal lymph nodes not enlarged.     Lungs:  Non labored breathing, non labored speech. No cough.  No audible wheezing. Even, quiet breathing.       Vascular:  positive pedal pulses  bilaterally for both the DP and PT arteries.  CFT < 3 sec.  negative ankle edema.  positive pedal hair growth.    Neuro:  Alert and oriented x 3. Coordinated gait.  Light touch sensation is intact to the L4, L5, S1 distributions. No obvious deficits.  No evidence of neurological-based weakness, spasticity, or contracture in the lower extremities.      Derm: Normal texture and turgor.  No erythema, ecchymosis, or cyanosis.      Musculoskeletal:     Patient is ambulatory without an assistive device or brace.     Normal arch with weightbearing.  No forefoot or rear foot deformities noted.  MS 5/5 all compartments.  Normal ROM all fore foot and rearfoot joints.  No equinus.  right fifth toe is edematous.  Toe rectus.  virtually no painful on palpation.      Radiographic Exam:  X-Ray Findings:  I personally reviewed the films.    TOE RIGHT TWO OR MORE VIEWS   6/7/2021 9:11 AM      HISTORY:  Right toe injury 2 weeks ago, pain in pinkie toe. Injury of  toe on right foot, subsequent encounter.                                                                      IMPRESSION: Longitudinal fracture of the fifth proximal phalanx with 2  mm of dorsal displacement.      A: Toe fracture    P: Xrays from past personally reviewed.  Discussed with patient that since not significantly malaligned will watch for now and give more time to heal.  Patient continue to wear walking boot or stiff shoes to keep this offloaded for the next month.  She will slowly increase activities as tolerated.  Discussed this toe may always be more edematous in the contralateral toe.  We will have her return to work on 6/28/2021.  Return to clinic prn.  Thank you for allowing me participate in the care of this patient.        Benoit Grijalva, CURRY, FACFAS

## 2021-07-07 ENCOUNTER — MYC MEDICAL ADVICE (OUTPATIENT)
Dept: FAMILY MEDICINE | Facility: CLINIC | Age: 46
End: 2021-07-07

## 2021-07-14 ENCOUNTER — TELEPHONE (OUTPATIENT)
Dept: FAMILY MEDICINE | Facility: CLINIC | Age: 46
End: 2021-07-14

## 2021-07-14 NOTE — TELEPHONE ENCOUNTER
Reason for Call:  Form, our goal is to have forms completed with 72 hours, however, some forms may require a visit or additional information.    Type of letter, form or note:  employer    Who is the form from?: Patient    Where did the form come from: Patient or family brought in       What clinic location was the form placed at?: [place]    Where the form was placed: Given to MA/RN    What number is listed as a contact on the form?:        Additional comments:    Call taken on 7/14/2021 at 8:40 AM by Kathleen Castillo

## 2021-07-15 NOTE — TELEPHONE ENCOUNTER
I faxed the completed [place] [employer]employment forms to fax #[phone].  Ekta Araiza,  [facility]

## 2021-07-23 ENCOUNTER — MYC MEDICAL ADVICE (OUTPATIENT)
Dept: PODIATRY | Facility: CLINIC | Age: 46
End: 2021-07-23

## 2021-07-26 ENCOUNTER — OFFICE VISIT (OUTPATIENT)
Dept: FAMILY MEDICINE | Facility: CLINIC | Age: 46
End: 2021-07-26
Payer: COMMERCIAL

## 2021-07-26 VITALS
TEMPERATURE: 97.7 F | BODY MASS INDEX: 24.75 KG/M2 | HEIGHT: 62 IN | HEART RATE: 69 BPM | RESPIRATION RATE: 16 BRPM | DIASTOLIC BLOOD PRESSURE: 64 MMHG | OXYGEN SATURATION: 100 % | WEIGHT: 134.5 LBS | SYSTOLIC BLOOD PRESSURE: 122 MMHG

## 2021-07-26 DIAGNOSIS — Z12.31 ENCOUNTER FOR SCREENING MAMMOGRAM FOR BREAST CANCER: ICD-10-CM

## 2021-07-26 DIAGNOSIS — S29.011A MUSCLE STRAIN OF CHEST WALL, INITIAL ENCOUNTER: Primary | ICD-10-CM

## 2021-07-26 PROCEDURE — 99213 OFFICE O/P EST LOW 20 MIN: CPT | Performed by: PHYSICIAN ASSISTANT

## 2021-07-26 ASSESSMENT — PAIN SCALES - GENERAL: PAINLEVEL: MODERATE PAIN (4)

## 2021-07-26 ASSESSMENT — MIFFLIN-ST. JEOR: SCORE: 1198.34

## 2021-07-26 NOTE — PATIENT INSTRUCTIONS
Please call [phone] to schedule the screening mammogram. Check with insurance to see if they cover a 3D screening mammo.      For your chest wall pain, I suggest heat or ice (whichever feels better), limit lifting/pushing/pulling, and use ibuprofen 400 mg twice per day for 5 days (with food), then as needed after that.    There are no signs of an infection or lump on exam today, which is very reassuring.

## 2021-07-26 NOTE — PROGRESS NOTES
"    Assessment & Plan     Muscle strain of chest wall, initial encounter  Discussed etiology and benign nature of this condition.  Went over various treatment options, which typically focus on pain relief through use of anti-inflammatories.  Rest and use ice/heat PRN.  Avoid heavy lifting.  If symptoms do not improve, follow-up for a recheck.       For your chest wall pain, I suggest heat or ice (whichever feels better), limit lifting/pushing/pulling, and use ibuprofen 400 mg twice per day for 5 days (with food), then as needed after that.    There are no signs of an infection or lump on exam today, which is very reassuring.       Encounter for screening mammogram for breast cancer  Please call [phone] to schedule the screening mammogram. Check with insurance to see if they cover a 3D screening mammo.    - MA Screen Bilateral w/[place]; Future      15 minutes spent on the date of the encounter doing chart review, history and exam, documentation and further activities per the note           Return in about 4 weeks (around 8/23/2021) for a recheck if symptoms do not improve.    Jayshree Chowdhury PA-C  [facility] JUAQUIN Stallings is a 45 year old who presents for the following health issues     HPI     - Right breast pain x4 days. No known injury. Area feels hard, no redness, swelling, drainage, lumps/masses. Area is aggravated/throbbing pain. No known family hx of breast cancer.    Feels a fullness in the area.       Review of Systems   Constitutional, HEENT, cardiovascular, pulmonary, gi and gu systems are negative, except as otherwise noted.      Objective    /64   Pulse 69   Temp 97.7  F (36.5  C) (Tympanic)   Resp 16   Ht 1.575 m (5' 2\")   Wt 61 kg (134 lb 8 oz)   SpO2 100%   BMI 24.60 kg/m    Body mass index is 24.6 kg/m .  Physical Exam   GENERAL: healthy, alert and no distress  NECK: no adenopathy, no asymmetry, masses, or scars and thyroid normal to palpation  BREAST: " normal without masses or nipple discharge and no palpable axillary masses or adenopathy  Tenderness over right chest, below nipple line.  Tenderness reproducible with pressure. No warmth or redness.

## 2021-09-26 ENCOUNTER — HEALTH MAINTENANCE LETTER (OUTPATIENT)
Age: 46
End: 2021-09-26

## 2022-05-08 ENCOUNTER — HEALTH MAINTENANCE LETTER (OUTPATIENT)
Age: 47
End: 2022-05-08

## 2022-05-26 NOTE — PROGRESS NOTES
SUBJECTIVE:   CC: Megan Kearney is an 46 year old woman who presents for preventive health visit.       Patient has been advised of split billing requirements and indicates understanding: Yes  Healthy Habits:     Getting at least 3 servings of Calcium per day:  Yes    Bi-annual eye exam:  Yes    Dental care twice a year:  Yes    Sleep apnea or symptoms of sleep apnea:  None    Diet:  Regular (no restrictions)    Frequency of exercise:  6-7 days/week    Duration of exercise:  30-45 minutes    Taking medications regularly:  Yes    Medication side effects:  Not applicable    PHQ-2 Total Score: 0    Additional concerns today:  No              Today's PHQ-2 Score:   PHQ-2 ( 1999 Pfizer) 6/2/2022   Q1: Little interest or pleasure in doing things 0   Q2: Feeling down, depressed or hopeless 0   PHQ-2 Score 0   PHQ-2 Total Score (12-17 Years)- Positive if 3 or more points; Administer PHQ-A if positive -   Q1: Little interest or pleasure in doing things Not at all   Q2: Feeling down, depressed or hopeless Not at all   PHQ-2 Score 0       Abuse: Current or Past (Physical, Sexual or Emotional) - No  Do you feel safe in your environment? Yes        Social History     Tobacco Use     Smoking status: Never Smoker     Smokeless tobacco: Never Used     Tobacco comment: Nonsmoking household   Substance Use Topics     Alcohol use: No     Alcohol/week: 0.0 standard drinks         Alcohol Use 6/2/2022   Prescreen: >3 drinks/day or >7 drinks/week? Not Applicable   Prescreen: >3 drinks/day or >7 drinks/week? -       Reviewed orders with patient.  Reviewed health maintenance and updated orders accordingly - Yes    G 3 P 2   Patient's last menstrual period was 05/22/2022 (exact date).     Fasting: No   Td: tdap 2/2020       Flu: 2/2020      Covid: Pf 6/21/2021      Shingrix: NA      PPV: NA      Last 3 Pap and HPV Results:   PAP / HPV Latest Ref Rng & Units 2/13/2020 8/7/2015 9/25/2012   PAP (Historical) - NIL NIL NIL   HPV16  NEG:Negative Negative - -   HPV18 NEG:Negative Negative - -   HRHPV NEG:Negative Negative - -                  Cholesterol:   Lab Results   Component Value Date    CHOL 175 05/26/2021     Lab Results   Component Value Date    HDL 59 05/26/2021     Lab Results   Component Value Date     05/26/2021     Lab Results   Component Value Date    TRIG 57 05/26/2021     Lab Results   Component Value Date    CHOLHDLRATIO 3.0 07/31/2015         MMG: 3/2020  Dexa:  NA     Flex/colo: 3/2020 q 10y scope      Seat Belt: Yes    Sunscreen use: Yes   Calcium Intake: adeq  Health Care Directive: Yes   Sexually Active: Yes     Current contraception: Essure  History of abnormal Pap smear: No  Family history of colon/breast/ovarian cancer: Yes: see [facility]  Regular self breast exam: Yes  History of abnormal mammogram: No      Labs reviewed in EPIC  BP Readings from Last 3 Encounters:   06/02/22 123/72   07/26/21 122/64   06/15/21 114/74    Wt Readings from Last 3 Encounters:   06/02/22 58.7 kg (129 lb 6.4 oz)   07/26/21 61 kg (134 lb 8 oz)   06/07/21 61.2 kg (135 lb)                  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     Hx of LASIK     MMT (medial meniscus tear)     Psoriasis     Herpes labialis     Past Surgical History:   Procedure Laterality Date     ARTHROSCOPY KNEE Right 5/27/2015    Procedure: ARTHROSCOPY KNEE;  Surgeon: Dima Haile MD;  Location: [facility] OR     COLONOSCOPY  3/2020     D & C       HC HYSTEROS W PERMANENT FALLOPAIN IMPLANT      essure     LASIK      2008       Social History     Tobacco Use     Smoking status: Never Smoker     Smokeless tobacco: Never Used     Tobacco comment: Nonsmoking household   Substance Use Topics     Alcohol use: No      "Alcohol/week: 0.0 standard drinks     Family History   Problem Relation Age of Onset     Cancer Paternal Grandmother 50        started as colon, mets to liver     Diabetes Paternal Grandmother      Colon Cancer Paternal Grandmother         passed away in 1983 from cancer     Hypertension Mother      Thyroid Disease Mother      Hypertension Maternal Grandmother      Hypertension Maternal Grandfather      Diabetes Maternal Grandfather      Gastrointestinal Disease Sister         colitis     Gastrointestinal Disease Maternal Uncle         crohns     Glaucoma No family hx of      Macular Degeneration No family hx of          Current Outpatient Medications   Medication Sig Dispense Refill     CALCIUM + D 600-200 MG-UNIT OR TABS 1 TABLET DAILY       carboxymethylcellul-glycerin (OPTIVE) 0.5-0.9 % SOLN Apply 1 drop to eye 3 times daily. \"Refresh Optive artificial tears\"       clindamycin (CLINDAMAX) 1 % external gel Apply topically 2 times daily Profile RX - do not fill until pt requests or is due for refill. 60 g 12     fluticasone (FLONASE) 50 MCG/ACT nasal spray Spray 2 sprays into both nostrils daily 48 g 3     LUTEIN PO Take by mouth daily       MULTIVITAMIN TABS   OR 1 TABLET DAILY       valACYclovir (VALTREX) 1000 mg tablet 2 TABLETS every 12 hours for two doses 12 tablet 6     zolpidem (AMBIEN) 5 MG tablet Take 1 tablet (5 mg) by mouth nightly as needed for sleep at bedtime. 90 tablet 1     mupirocin (BACTROBAN) 2 % cream Apply topically 3 times daily (Patient not taking: Reported on 6/2/2022) 30 g 3     triamcinolone (KENALOG) 0.1 % cream Apply  topically 2 times daily. (Patient not taking: Reported on 6/2/2022) 453.6 g 1       Breast Cancer Screening:    Breast CA Risk Assessment (FHS-7) 5/25/2021   Do you have a family history of breast, colon, or ovarian cancer? No / Unknown         Mammogram Screening: Recommended annual mammography  Pertinent mammograms are reviewed under the imaging tab.      Reviewed and " "updated as needed this visit by clinical staff   Tobacco  Allergies  Meds  Problems  Med Hx  Surg Hx  Fam Hx  Soc   Hx          Reviewed and updated as needed this visit by Provider   Tobacco  Allergies  Meds  Problems  Med Hx  Surg Hx  Fam Hx               Review of Systems   Constitutional: Negative for chills and fever.   HENT: Negative for congestion, ear pain, hearing loss and sore throat.    Eyes: Negative for pain and visual disturbance.   Respiratory: Negative for cough and shortness of breath.    Cardiovascular: Negative for chest pain, palpitations and peripheral edema.   Gastrointestinal: Negative for abdominal pain, constipation, diarrhea, heartburn, hematochezia and nausea.   Genitourinary: Negative for dysuria, frequency, genital sores, hematuria and urgency.   Musculoskeletal: Negative for arthralgias, joint swelling and myalgias.   Skin: Negative for rash.   Neurological: Negative for dizziness, weakness, headaches and paresthesias.   Psychiatric/Behavioral: Negative for mood changes. The patient is not nervous/anxious.           OBJECTIVE:   /72   Pulse 83   Temp 98.2  F (36.8  C) (Oral)   Resp 16   Ht 1.568 m (5' 1.75\")   Wt 58.7 kg (129 lb 6.4 oz)   LMP 05/22/2022 (Exact Date)   SpO2 100%   BMI 23.86 kg/m    Physical Exam  GENERAL: healthy, alert and no distress  EYES: Eyes grossly normal to inspection, PERRL and conjunctivae and sclerae normal  HENT: ear canals and TM's normal, nose and mouth without ulcers or lesions  NECK: no adenopathy, no asymmetry, masses, or scars and thyroid normal to palpation  RESP: lungs clear to auscultation - no rales, rhonchi or wheezes  BREAST: normal without masses, tenderness or nipple discharge and no palpable axillary masses or adenopathy  CV: regular rate and rhythm, normal S1 S2, no S3 or S4, no murmur, click or rub, no peripheral edema and peripheral pulses strong  ABDOMEN: soft, nontender, no hepatosplenomegaly, no masses and " "bowel sounds normal  MS: no gross musculoskeletal defects noted, no edema  SKIN: no suspicious lesions or rashes  NEURO: Normal strength and tone, mentation intact and speech normal  PSYCH: mentation appears normal, affect normal/bright    Diagnostic Test Results:  Labs reviewed in Epic    ASSESSMENT/PLAN:   (Z00.00) Routine general medical examination at a health care facility  (primary encounter diagnosis)  Comment: preventive needs reviewed   Plan: see orders in Epic.     (B00.1) Herpes labialis  Comment: occas outbreaks  Plan: valACYclovir (VALTREX) 1000 mg tablet,         OFFICE/OUTPT VISIT,EST,LEVL III        Refill x 1 yr     (G47.00) Insomnia, unspecified type  Comment: stable, improved on ambien  Plan: zolpidem (AMBIEN) 5 MG tablet, OFFICE/OUTPT         VISIT,EST,LEVL III        Refill x 1 yr     (L71.0) POD (perioral dermatitis)  Comment: controlled  Plan: clindamycin (CLINDAMAX) 1 % external gel,         OFFICE/OUTPT VISIT,EST,LEVL III        Refill x 1 yr           COUNSELING:  Reviewed preventive health counseling, as reflected in patient instructions  Special attention given to:        Regular exercise       Healthy diet/nutrition    Estimated body mass index is 23.86 kg/m  as calculated from the following:    Height as of this encounter: 1.568 m (5' 1.75\").    Weight as of this encounter: 58.7 kg (129 lb 6.4 oz).        She reports that she has never smoked. She has never used smokeless tobacco.      Counseling Resources:  ATP IV Guidelines  Pooled Cohorts Equation Calculator  Breast Cancer Risk Calculator  BRCA-Related Cancer Risk Assessment: FHS-7 Tool  FRAX Risk Assessment  ICSI Preventive Guidelines  Dietary Guidelines for Americans, 2010  USDA's MyPlate  ASA Prophylaxis  Lung CA Screening    Bing Coughlin MD  [facility]  "

## 2022-06-02 ENCOUNTER — OFFICE VISIT (OUTPATIENT)
Dept: FAMILY MEDICINE | Facility: CLINIC | Age: 47
End: 2022-06-02
Payer: COMMERCIAL

## 2022-06-02 VITALS
HEART RATE: 83 BPM | RESPIRATION RATE: 16 BRPM | OXYGEN SATURATION: 100 % | SYSTOLIC BLOOD PRESSURE: 123 MMHG | DIASTOLIC BLOOD PRESSURE: 72 MMHG | BODY MASS INDEX: 23.81 KG/M2 | HEIGHT: 62 IN | WEIGHT: 129.4 LBS | TEMPERATURE: 98.2 F

## 2022-06-02 DIAGNOSIS — G47.00 INSOMNIA, UNSPECIFIED TYPE: ICD-10-CM

## 2022-06-02 DIAGNOSIS — Z00.00 ROUTINE GENERAL MEDICAL EXAMINATION AT A HEALTH CARE FACILITY: Primary | ICD-10-CM

## 2022-06-02 DIAGNOSIS — L71.0 POD (PERIORAL DERMATITIS): ICD-10-CM

## 2022-06-02 DIAGNOSIS — B00.1 HERPES LABIALIS: ICD-10-CM

## 2022-06-02 PROCEDURE — 99396 PREV VISIT EST AGE 40-64: CPT | Performed by: FAMILY MEDICINE

## 2022-06-02 PROCEDURE — 99214 OFFICE O/P EST MOD 30 MIN: CPT | Mod: 25 | Performed by: FAMILY MEDICINE

## 2022-06-02 RX ORDER — ZOLPIDEM TARTRATE 5 MG/1
5 TABLET ORAL
Qty: 90 TABLET | Refills: 3 | Status: SHIPPED | OUTPATIENT
Start: 2022-06-02 | End: 2023-06-07

## 2022-06-02 RX ORDER — VALACYCLOVIR HYDROCHLORIDE 1 G/1
TABLET, FILM COATED ORAL
Qty: 12 TABLET | Refills: 6 | Status: SHIPPED | OUTPATIENT
Start: 2022-06-02 | End: 2023-06-07

## 2022-06-02 RX ORDER — CLINDAMYCIN PHOSPHATE 10 MG/G
GEL TOPICAL 2 TIMES DAILY
Qty: 60 G | Refills: 12 | Status: SHIPPED | OUTPATIENT
Start: 2022-06-02 | End: 2023-06-07

## 2022-06-02 ASSESSMENT — ENCOUNTER SYMPTOMS
CONSTIPATION: 0
DYSURIA: 0
FREQUENCY: 0
DIZZINESS: 0
CHILLS: 0
HEARTBURN: 0
HEADACHES: 0
DIARRHEA: 0
JOINT SWELLING: 0
WEAKNESS: 0
HEMATURIA: 0
PALPITATIONS: 0
EYE PAIN: 0
NERVOUS/ANXIOUS: 0
MYALGIAS: 0
FEVER: 0
NAUSEA: 0
SORE THROAT: 0
ARTHRALGIAS: 0
PARESTHESIAS: 0
HEMATOCHEZIA: 0
ABDOMINAL PAIN: 0
SHORTNESS OF BREATH: 0
COUGH: 0

## 2022-06-02 ASSESSMENT — PAIN SCALES - GENERAL: PAINLEVEL: NO PAIN (0)

## 2022-10-03 ENCOUNTER — ANCILLARY PROCEDURE (OUTPATIENT)
Dept: MAMMOGRAPHY | Facility: CLINIC | Age: 47
End: 2022-10-03
Attending: FAMILY MEDICINE
Payer: COMMERCIAL

## 2022-10-03 DIAGNOSIS — Z12.31 VISIT FOR SCREENING MAMMOGRAM: ICD-10-CM

## 2022-10-03 PROCEDURE — 77063 BREAST TOMOSYNTHESIS BI: CPT | Mod: TC | Performed by: RADIOLOGY

## 2022-10-03 PROCEDURE — 77067 SCR MAMMO BI INCL CAD: CPT | Mod: TC | Performed by: RADIOLOGY

## 2023-01-08 ENCOUNTER — HEALTH MAINTENANCE LETTER (OUTPATIENT)
Age: 48
End: 2023-01-08

## 2023-05-03 ENCOUNTER — PATIENT OUTREACH (OUTPATIENT)
Dept: CARE COORDINATION | Facility: CLINIC | Age: 48
End: 2023-05-03
Payer: COMMERCIAL

## 2023-06-07 ENCOUNTER — OFFICE VISIT (OUTPATIENT)
Dept: FAMILY MEDICINE | Facility: CLINIC | Age: 48
End: 2023-06-07
Payer: COMMERCIAL

## 2023-06-07 VITALS
WEIGHT: 132 LBS | TEMPERATURE: 97.9 F | OXYGEN SATURATION: 100 % | RESPIRATION RATE: 22 BRPM | BODY MASS INDEX: 24.29 KG/M2 | SYSTOLIC BLOOD PRESSURE: 135 MMHG | HEIGHT: 62 IN | HEART RATE: 68 BPM | DIASTOLIC BLOOD PRESSURE: 75 MMHG

## 2023-06-07 DIAGNOSIS — L71.0 POD (PERIORAL DERMATITIS): ICD-10-CM

## 2023-06-07 DIAGNOSIS — G47.00 INSOMNIA, UNSPECIFIED TYPE: ICD-10-CM

## 2023-06-07 DIAGNOSIS — B00.1 HERPES LABIALIS: ICD-10-CM

## 2023-06-07 DIAGNOSIS — L40.9 PSORIASIS: ICD-10-CM

## 2023-06-07 DIAGNOSIS — Z00.00 ROUTINE GENERAL MEDICAL EXAMINATION AT A HEALTH CARE FACILITY: Primary | ICD-10-CM

## 2023-06-07 PROCEDURE — 99213 OFFICE O/P EST LOW 20 MIN: CPT | Mod: 25 | Performed by: FAMILY MEDICINE

## 2023-06-07 PROCEDURE — 99396 PREV VISIT EST AGE 40-64: CPT | Performed by: FAMILY MEDICINE

## 2023-06-07 RX ORDER — VALACYCLOVIR HYDROCHLORIDE 1 G/1
TABLET, FILM COATED ORAL
Qty: 12 TABLET | Refills: 6 | Status: SHIPPED | OUTPATIENT
Start: 2023-06-07 | End: 2024-06-12

## 2023-06-07 RX ORDER — TRIAMCINOLONE ACETONIDE 1 MG/G
CREAM TOPICAL
Qty: 80 G | Refills: 3 | Status: SHIPPED | OUTPATIENT
Start: 2023-06-07 | End: 2024-06-12

## 2023-06-07 RX ORDER — ZOLPIDEM TARTRATE 5 MG/1
5 TABLET ORAL
Qty: 90 TABLET | Refills: 3 | Status: SHIPPED | OUTPATIENT
Start: 2023-06-07 | End: 2024-06-12

## 2023-06-07 RX ORDER — CLINDAMYCIN PHOSPHATE 10 MG/G
GEL TOPICAL 2 TIMES DAILY
Qty: 60 G | Refills: 12 | Status: SHIPPED | OUTPATIENT
Start: 2023-06-07 | End: 2024-06-12

## 2023-06-07 ASSESSMENT — ENCOUNTER SYMPTOMS
HEARTBURN: 0
FEVER: 0
MYALGIAS: 0
EYE PAIN: 0
CONSTIPATION: 0
HEADACHES: 0
ABDOMINAL PAIN: 0
JOINT SWELLING: 0
HEMATURIA: 0
ARTHRALGIAS: 0
SHORTNESS OF BREATH: 0
PARESTHESIAS: 0
NERVOUS/ANXIOUS: 0
PALPITATIONS: 0
DIZZINESS: 0
SORE THROAT: 0
DYSURIA: 0
DIARRHEA: 0
COUGH: 0
HEMATOCHEZIA: 0
NAUSEA: 0
WEAKNESS: 0
FREQUENCY: 0
CHILLS: 0

## 2023-06-07 ASSESSMENT — PAIN SCALES - GENERAL: PAINLEVEL: NO PAIN (0)

## 2023-06-07 NOTE — PROGRESS NOTES
SUBJECTIVE:   CC: Haylie is an 47 year old who presents for preventive health visit.       6/7/2023     2:13 PM   Additional Questions   Roomed by dia   Accompanied by self         6/7/2023     2:13 PM   Patient Reported Additional Medications   Patient reports taking the following new medications see chart     Healthy Habits:     Getting at least 3 servings of Calcium per day:  Yes    Bi-annual eye exam:  Yes    Dental care twice a year:  Yes    Sleep apnea or symptoms of sleep apnea:  None    Diet:  Regular (no restrictions)    Frequency of exercise:  6-7 days/week    Duration of exercise:  30-45 minutes    Taking medications regularly:  Yes    Medication side effects:  Not applicable    PHQ-2 Total Score: 0    Additional concerns today:  Yes                  Today's PHQ-2 Score:       6/7/2023     2:08 PM   PHQ-2 ( 1999 [employer])   Q1: Little interest or pleasure in doing things 0   Q2: Feeling down, depressed or hopeless 0   PHQ-2 Score 0   Q1: Little interest or pleasure in doing things Not at all   Q2: Feeling down, depressed or hopeless Not at all   PHQ-2 Score 0           Social History     Tobacco Use     Smoking status: Never     Smokeless tobacco: Never     Tobacco comments:     Nonsmoking household   Vaping Use     Vaping status: Never Used   Substance Use Topics     Alcohol use: No     Alcohol/week: 0.0 standard drinks of alcohol             6/7/2023     2:08 PM   Alcohol Use   Prescreen: >3 drinks/day or >7 drinks/week? Not Applicable     Reviewed orders with patient.  Reviewed health maintenance and updated orders accordingly - Yes    G 3 P 2   Patient's last menstrual period was 05/21/2023.     Fasting: No   Td: tdap 2/2020       Flu: 2/2020      Covid: Pf 6/21/2021      Shingrix: NA      PPV: NA      Last 3 Pap and HPV Results:       Latest Ref Rng & Units 2/13/2020     9:50 AM 2/13/2020     9:38 AM 8/7/2015    12:00 AM   PAP / HPV   PAP (Historical)   NIL   NIL     HPV 16 DNA NEG^Negative  Negative       HPV 18 DNA NEG^Negative Negative       Other HR HPV NEG^Negative Negative                      Cholesterol:   Lab Results   Component Value Date    CHOL 175 05/26/2021     Lab Results   Component Value Date    HDL 59 05/26/2021     Lab Results   Component Value Date     05/26/2021     Lab Results   Component Value Date    TRIG 57 05/26/2021     Lab Results   Component Value Date    CHOLHDLRATIO 3.0 07/31/2015         MMG: 10/2022  Dexa:  NA     Flex/colo: 3/2020 q10y scope      Seat Belt: Yes    Sunscreen use: Yes   Calcium Intake: adeq  Health Care Directive: Yes   Sexually Active: Yes     Current contraception: Essure  History of abnormal Pap smear: No  [facility]  Regular self breast exam: Yes  History of abnormal mammogram: No      Labs reviewed in EPIC  BP Readings from Last 3 Encounters:   06/07/23 135/75   06/02/22 123/72   07/26/21 122/64    Wt Readings from Last 3 Encounters:   06/07/23 59.9 kg (132 lb)   06/02/22 58.7 kg (129 lb 6.4 oz)   07/26/21 61 kg (134 lb 8 oz)                  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     Hx of LASIK     MMT (medial meniscus tear)     Psoriasis     Herpes labialis     Past Surgical History:   Procedure Laterality Date     ARTHROSCOPY KNEE Right 5/27/2015    Procedure: ARTHROSCOPY KNEE;  Surgeon: Dima Haile MD;  Location: [facility] OR     COLONOSCOPY  3/2020     D & C       HC HYSTEROS W PERMANENT FALLOPAIN IMPLANT      essure     LASIK      2008       Social History     Tobacco Use     Smoking status: Never     Smokeless tobacco: Never     Tobacco comments:     Nonsmoking household   Vaping Use     Vaping status: Never Used   Substance Use Topics  "    Alcohol use: No     Alcohol/week: 0.0 standard drinks of alcohol     Family History   Problem Relation Age of Onset     Cancer Paternal Grandmother 50        started as colon, mets to liver     Diabetes Paternal Grandmother      Colon Cancer Paternal Grandmother         passed away in  from cancer     Hypertension Mother      Thyroid Disease Mother      Hypertension Maternal Grandmother      Hypertension Maternal Grandfather      Diabetes Maternal Grandfather      Gastrointestinal Disease Sister         colitis     Gastrointestinal Disease Maternal Uncle         crohns     Glaucoma No family hx of      Macular Degeneration No family hx of          Current Outpatient Medications   Medication Sig Dispense Refill     CALCIUM + D 600-200 MG-UNIT OR TABS 1 TABLET DAILY       carboxymethylcellul-glycerin (OPTIVE) 0.5-0.9 % SOLN Apply 1 drop to eye 3 times daily. \"Refresh Optive artificial tears\"       clindamycin (CLINDAMAX) 1 % external gel Apply topically 2 times daily Profile RX - do not fill until pt requests or is due for refill. 60 g 12     LUTEIN PO Take by mouth daily       MULTIVITAMIN TABS   OR 1 TABLET DAILY       triamcinolone (KENALOG) 0.1 % cream Apply  topically 2 times daily. 453.6 g 1     valACYclovir (VALTREX) 1000 mg tablet 2 TABLETS every 12 hours for two doses 12 tablet 6     zolpidem (AMBIEN) 5 MG tablet Take 1 tablet (5 mg) by mouth nightly as needed for sleep at bedtime. 90 tablet 3     fluticasone (FLONASE) 50 MCG/ACT nasal spray Spray 2 sprays into both nostrils daily (Patient not taking: Reported on 2023) 48 g 3     mupirocin (BACTROBAN) 2 % cream Apply topically 3 times daily (Patient not taking: Reported on 2022) 30 g 3       Breast Cancer Screenin/25/2021     1:20 PM   Breast CA Risk Assessment (FHS-7)   Do you have a family history of breast, colon, or ovarian cancer? No / Unknown         Mammogram Screening: Recommended annual mammography  Pertinent mammograms are " "reviewed under the imaging tab.      Reviewed and updated as needed this visit by clinical staff   Tobacco  Allergies  Meds  Problems  Med Hx  Surg Hx  Fam Hx          Reviewed and updated as needed this visit by Provider   Tobacco  Allergies  Meds  Problems  Med Hx  Surg Hx  Fam Hx             Review of Systems   Constitutional: Negative for chills and fever.   HENT: Negative for congestion, ear pain, hearing loss and sore throat.    Eyes: Negative for pain and visual disturbance.   Respiratory: Negative for cough and shortness of breath.    Cardiovascular: Negative for chest pain, palpitations and peripheral edema.   Gastrointestinal: Negative for abdominal pain, constipation, diarrhea, heartburn, hematochezia and nausea.   Genitourinary: Negative for dysuria, frequency, genital sores, hematuria and urgency.   Musculoskeletal: Negative for arthralgias, joint swelling and myalgias.   Skin: Positive for rash.   Neurological: Negative for dizziness, weakness, headaches and paresthesias.   Psychiatric/Behavioral: Negative for mood changes. The patient is not nervous/anxious.           OBJECTIVE:   /75   Pulse 68   Temp 97.9  F (36.6  C) (Oral)   Resp 22   Ht 1.575 m (5' 2\")   Wt 59.9 kg (132 lb)   LMP 05/21/2023   SpO2 100%   BMI 24.14 kg/m    Physical Exam  GENERAL: healthy, alert and no distress  EYES: Eyes grossly normal to inspection, PERRL and conjunctivae and sclerae normal  HENT: ear canals and TM's normal, nose and mouth without ulcers or lesions  NECK: no adenopathy, no asymmetry, masses, or scars and thyroid normal to palpation  RESP: lungs clear to auscultation - no rales, rhonchi or wheezes  BREAST: normal without masses, tenderness or nipple discharge and no palpable axillary masses or adenopathy  CV: regular rate and rhythm, normal S1 S2, no S3 or S4, no murmur, click or rub, no peripheral edema and peripheral pulses strong  ABDOMEN: soft, nontender, no hepatosplenomegaly, no " masses and bowel sounds normal  MS: no gross musculoskeletal defects noted, no edema  SKIN: no suspicious lesions or rashes  NEURO: Normal strength and tone, mentation intact and speech normal  PSYCH: mentation appears normal, affect normal/bright    Diagnostic Test Results:  Labs reviewed in Epic    ASSESSMENT/PLAN:   (Z00.00) Routine general medical examination at a health care facility  (primary encounter diagnosis)  Comment: preventive needs reviewed  Plan: see orders in Epic.     (L71.0) POD (perioral dermatitis)  Comment: controlled  Plan: clindamycin (CLINDAMAX) 1 % external gel        Refill x 1 yr    (B00.1) Herpes labialis  Comment: episodic  Plan: valACYclovir (VALTREX) 1000 mg tablet        Refill x 1 yr     (G47.00) Insomnia, unspecified type  Comment: stable  Plan: zolpidem (AMBIEN) 5 MG tablet        Refill x 1 yr     (L40.9) Psoriasis  Comment: controlled  Plan: triamcinolone (KENALOG) 0.1 % external cream        Refill x 1 yr             COUNSELING:  Reviewed preventive health counseling, as reflected in patient instructions  Special attention given to:        Regular exercise       Healthy diet/nutrition        She reports that she has never smoked. She has never used smokeless tobacco.          Bing Coughlin MD  [facility]

## 2023-09-07 ENCOUNTER — NURSE TRIAGE (OUTPATIENT)
Dept: NURSING | Facility: CLINIC | Age: 48
End: 2023-09-07
Payer: COMMERCIAL

## 2023-09-07 ENCOUNTER — MYC MEDICAL ADVICE (OUTPATIENT)
Dept: FAMILY MEDICINE | Facility: CLINIC | Age: 48
End: 2023-09-07
Payer: COMMERCIAL

## 2023-09-07 ENCOUNTER — E-VISIT (OUTPATIENT)
Dept: FAMILY MEDICINE | Facility: CLINIC | Age: 48
End: 2023-09-07
Payer: COMMERCIAL

## 2023-09-07 DIAGNOSIS — L01.00 IMPETIGO: Primary | ICD-10-CM

## 2023-09-07 PROCEDURE — 99422 OL DIG E/M SVC 11-20 MIN: CPT | Performed by: FAMILY MEDICINE

## 2023-09-07 RX ORDER — MUPIROCIN 20 MG/G
OINTMENT TOPICAL 3 TIMES DAILY
Qty: 22 G | Refills: 0 | Status: SHIPPED | OUTPATIENT
Start: 2023-09-07 | End: 2023-09-14

## 2023-09-07 NOTE — TELEPHONE ENCOUNTER
S-(situation): sore on upper lip    B-(background):   Started 2 days ago as 1 sore, now spreading to 3-4 sores on upper lip.   Pt states she knows it is impetigo as she had that it the past. Chart review shows she had clindamycin gel for perioral dermatitis on 9/24/2012.  Pt looking to get Rx for an antibiotic without getting seen, concerned about cost (high deductible insurance plan)    A-(assessment):   3-4 tiny bumps on upper lip with slight redness. Appears like tiny white blisters  No fever  No scabbing    Pt has tried Bacitracin but with no improvement      R-(recommendations):   FNA advised to be seen today, e-visit is appropriate. Pt asks if message can be sent to PCP for recommendations.    Mariely Jeff RN/[place] Nurse Advisor              Reason for Disposition   Unexplained sores and 3 or more    Additional Information   Negative: Sounds like a life-threatening emergency to the triager   Negative: Followed a burn   Negative: Followed an injury to the skin (such as a cut or scrape)   Negative: Boil (abscess) suspected (painful red lump)   Negative: Widespread rash or redness   Negative: Cold sore suspected (i.e., fever blister sore on the outer lip)   Negative: Insect bite(s) suspected   Negative: Poison ivy, oak, or sumac rash suspected (e.g., itchy rash after contact with poison ivy)   Negative: Sore(s) on female genital area  (e.g., labia, vagina, vulva)   Negative: Sore(s) on male genital area (e.g., penis, scrotum)   Negative: Wound infection suspected (in traumatic or surgical wound)   Negative: Black (necrotic), dark purple, or blisters develop in area of sore     Small bumpy sores   Negative: Patient sounds very sick or weak to the triager   Negative: Looks infected (e.g., spreading redness, pus) and fever   Negative: Looks infected and large red area (> 2 inches or 5 cm) or streak   Negative: Ulcer with black scab that is spreading, painless and cause unknown   Negative: Looks infected (e.g.,  spreading redness, pus) and no fever    Protocols used: Sores-A-OH

## 2023-09-07 NOTE — TELEPHONE ENCOUNTER
Patient notified of provider's message as written below. She has already sent a [employer] message with picture this am. Patient verbalized good understanding, had no further questions and needed no further support.Geetha Armstrong R.N.

## 2024-06-11 SDOH — HEALTH STABILITY: PHYSICAL HEALTH: ON AVERAGE, HOW MANY MINUTES DO YOU ENGAGE IN EXERCISE AT THIS LEVEL?: 30 MIN

## 2024-06-11 SDOH — HEALTH STABILITY: PHYSICAL HEALTH: ON AVERAGE, HOW MANY DAYS PER WEEK DO YOU ENGAGE IN MODERATE TO STRENUOUS EXERCISE (LIKE A BRISK WALK)?: 6 DAYS

## 2024-06-11 ASSESSMENT — SOCIAL DETERMINANTS OF HEALTH (SDOH): HOW OFTEN DO YOU GET TOGETHER WITH FRIENDS OR RELATIVES?: ONCE A WEEK

## 2024-06-12 ENCOUNTER — OFFICE VISIT (OUTPATIENT)
Dept: FAMILY MEDICINE | Facility: CLINIC | Age: 49
End: 2024-06-12
Payer: COMMERCIAL

## 2024-06-12 VITALS
TEMPERATURE: 98.5 F | SYSTOLIC BLOOD PRESSURE: 124 MMHG | DIASTOLIC BLOOD PRESSURE: 81 MMHG | HEIGHT: 62 IN | WEIGHT: 135.2 LBS | BODY MASS INDEX: 24.88 KG/M2 | HEART RATE: 77 BPM | OXYGEN SATURATION: 99 % | RESPIRATION RATE: 14 BRPM

## 2024-06-12 DIAGNOSIS — L40.9 PSORIASIS: ICD-10-CM

## 2024-06-12 DIAGNOSIS — Z12.31 VISIT FOR SCREENING MAMMOGRAM: ICD-10-CM

## 2024-06-12 DIAGNOSIS — G47.00 INSOMNIA, UNSPECIFIED TYPE: ICD-10-CM

## 2024-06-12 DIAGNOSIS — L71.0 POD (PERIORAL DERMATITIS): ICD-10-CM

## 2024-06-12 DIAGNOSIS — B00.1 HERPES LABIALIS: ICD-10-CM

## 2024-06-12 DIAGNOSIS — Z00.00 ROUTINE GENERAL MEDICAL EXAMINATION AT A HEALTH CARE FACILITY: Primary | ICD-10-CM

## 2024-06-12 DIAGNOSIS — Z13.6 CARDIOVASCULAR SCREENING; LDL GOAL LESS THAN 160: ICD-10-CM

## 2024-06-12 LAB
CHOLEST SERPL-MCNC: 174 MG/DL
FASTING STATUS PATIENT QL REPORTED: YES
FASTING STATUS PATIENT QL REPORTED: YES
GLUCOSE SERPL-MCNC: 88 MG/DL (ref 70–99)
HDLC SERPL-MCNC: 59 MG/DL
LDLC SERPL CALC-MCNC: 105 MG/DL
NONHDLC SERPL-MCNC: 115 MG/DL
TRIGL SERPL-MCNC: 51 MG/DL

## 2024-06-12 PROCEDURE — 99214 OFFICE O/P EST MOD 30 MIN: CPT | Mod: 25 | Performed by: FAMILY MEDICINE

## 2024-06-12 PROCEDURE — 80061 LIPID PANEL: CPT | Performed by: FAMILY MEDICINE

## 2024-06-12 PROCEDURE — 99396 PREV VISIT EST AGE 40-64: CPT | Mod: 25 | Performed by: FAMILY MEDICINE

## 2024-06-12 PROCEDURE — 90471 IMMUNIZATION ADMIN: CPT | Performed by: FAMILY MEDICINE

## 2024-06-12 PROCEDURE — 82947 ASSAY GLUCOSE BLOOD QUANT: CPT | Performed by: FAMILY MEDICINE

## 2024-06-12 PROCEDURE — 90750 HZV VACC RECOMBINANT IM: CPT | Performed by: FAMILY MEDICINE

## 2024-06-12 PROCEDURE — 36415 COLL VENOUS BLD VENIPUNCTURE: CPT | Performed by: FAMILY MEDICINE

## 2024-06-12 RX ORDER — CLINDAMYCIN PHOSPHATE 10 MG/G
GEL TOPICAL 2 TIMES DAILY
Qty: 60 G | Refills: 12 | Status: SHIPPED | OUTPATIENT
Start: 2024-06-12

## 2024-06-12 RX ORDER — TRIAMCINOLONE ACETONIDE 1 MG/G
CREAM TOPICAL
Qty: 15 G | Refills: 5 | Status: SHIPPED | OUTPATIENT
Start: 2024-06-12

## 2024-06-12 RX ORDER — ZOLPIDEM TARTRATE 5 MG/1
5 TABLET ORAL
Qty: 90 TABLET | Refills: 3 | Status: SHIPPED | OUTPATIENT
Start: 2024-06-12

## 2024-06-12 RX ORDER — VALACYCLOVIR HYDROCHLORIDE 1 G/1
TABLET, FILM COATED ORAL
Qty: 12 TABLET | Refills: 6 | Status: SHIPPED | OUTPATIENT
Start: 2024-06-12

## 2024-06-12 ASSESSMENT — PAIN SCALES - GENERAL: PAINLEVEL: NO PAIN (0)

## 2024-06-12 NOTE — PATIENT INSTRUCTIONS
"Preventive Care Advice   This is general advice we often give to help people stay healthy. Your care team may have specific advice just for you. Please talk to your care team about your own preventive care needs.  Lifestyle  Exercise at least 150 minutes each week (30 minutes a day, 5 days a week).  Do muscle strengthening activities 2 days a week. These help control your weight and prevent disease.  No smoking.  Wear sunscreen to prevent skin cancer.  Have your home tested for radon every 2 to 5 years. Radon is a colorless, odorless gas that can harm your lungs. To learn more, go to www.health.[facility].mn. and search for \"Radon in Homes.\"  Keep guns unloaded and locked up in a safe place like a safe or gun vault, or, use a gun lock and hide the keys. Always lock away bullets separately. To learn more, visit [employer].[place].gov and search for \"safe gun storage.\"  Nutrition  Eat 5 or more servings of fruits and vegetables each day.  Try wheat bread, brown rice and whole grain pasta (instead of white bread, rice, and pasta).  Get enough calcium and vitamin D. Check the label on foods and aim for 100% of the RDA (recommended daily allowance).  Regular exams  Have a dental exam and cleaning every 6 months.  See your health care team every year to talk about:  Any changes in your health.  Any medicines your care team has prescribed.  Preventive care, family planning, and ways to prevent chronic diseases.  Shots (vaccines)   HPV shots (up to age 26), if you've never had them before.  Hepatitis B shots (up to age 59), if you've never had them before.  COVID-19 shot: Get this shot when it's due.  Flu shot: Get a flu shot every year.  Tetanus shot: Get a tetanus shot every 10 years.  Pneumococcal, hepatitis A, and RSV shots: Ask your care team if you need these based on your risk.  Shingles shot (for age 50 and up).  General health tests  Diabetes screening:  Starting at age 35, Get screened for diabetes at least every 3 years.  If " you are younger than age 35, ask your care team if you should be screened for diabetes.  Cholesterol test: At age 39, start having a cholesterol test every 5 years, or more often if advised.  Bone density scan (DEXA): At age 50, ask your care team if you should have this scan for osteoporosis (brittle bones).  Hepatitis C: Get tested at least once in your life.  Abdominal aortic aneurysm screening: Talk to your doctor about having this screening if you:  Have ever smoked; and  Are biologically male; and  Are between the ages of 65 and 75.  STIs (sexually transmitted infections)  Before age 24: Ask your care team if you should be screened for STIs.  After age 24: Get screened for STIs if you're at risk. You are at risk for STIs (including HIV) if:  You are sexually active with more than one person.  You don't use condoms every time.  You or a partner was diagnosed with a sexually transmitted infection.  If you are at risk for HIV, ask about PrEP medicine to prevent HIV.  Get tested for HIV at least once in your life, whether you are at risk for HIV or not.  Cancer screening tests  Cervical cancer screening: If you have a cervix, begin getting regular cervical cancer screening tests at age 21. Most people who have regular screenings with normal results can stop after age 65. Talk about this with your provider.  Breast cancer scan (mammogram): If you've ever had breasts, begin having regular mammograms starting at age 40. This is a scan to check for breast cancer.  Colon cancer screening: It is important to start screening for colon cancer at age 45.  Have a colonoscopy test every 10 years (or more often if you're at risk) Or, ask your provider about stool tests like a FIT test every year or Cologuard test every 3 years.  To learn more about your testing options, visit: www.[place]/001391.pdf.  For help making a decision, visit: tosin/ag51987.  Prostate cancer screening test: If you have a prostate and are age 55  to 69, ask your provider if you would benefit from a yearly prostate cancer screening test.  Lung cancer screening: If you are a current or former smoker age 50 to 80, ask your care team if ongoing lung cancer screenings are right for you.  For informational purposes only. Not to replace the advice of your health care provider. Copyright   2023 [employer]. All rights reserved. Clinically reviewed by the [facility] Transitions Program. Eligible [id number] - REV 04/24.

## 2024-06-12 NOTE — PROGRESS NOTES
Preventive Care Visit  [facility]  Bing Coughlin MD, Family Medicine  Jun 12, 2024      Assessment & Plan     (Z00.00) Routine general medical examination at a health care facility  (primary encounter diagnosis)  Comment: preventive needs reviewed   Plan: Glucose        Jror     (L71.0) POD (perioral dermatitis)  Comment: controlled  Plan: clindamycin (CLEOCIN-T) 1 % external gel        Refill x 1 yr     (L40.9) Psoriasis  Comment: occas flares  Plan: triamcinolone (KENALOG) 0.1 % external cream        Refill x 1 yr     (B00.1) Herpes labialis  Comment: rare  Plan: valACYclovir (VALTREX) 1000 mg tablet        Refill x 1 yr    (G47.00) Insomnia, unspecified type  Comment: stable  Plan: zolpidem (AMBIEN) 5 MG tablet        Refill x 1 yr    (Z13.6) CARDIOVASCULAR SCREENING; LDL GOAL LESS THAN 160  Comment: due  Plan: Lipid panel reflex to direct LDL Fasting            (Z12.31) Visit for screening mammogram  Comment: due  Plan: MA Screen Bilateral w/[place]                    Counseling  Appropriate preventive services were discussed with this patient, including applicable screening as appropriate for fall prevention, nutrition, physical activity, Tobacco-use cessation, weight loss and cognition.  Checklist reviewing preventive services available has been given to the patient.  Reviewed patient's diet, addressing concerns and/or questions.   She is at risk for psychosocial distress and has been provided with information to reduce risk.       See Patient Instructions    Ricci Stallings is a 48 year old, presenting for the following:  Physical        6/12/2024     8:33 AM   Additional Questions   Roomed by Leonardo        Health Care Reji  Patient does not have a Health Care Directive or Living Will: Patient states has Advance Directive and will bring in a copy to clinic.    HPI  Plantar wart left foot, midshaft of 5th metatarsal            6/11/2024   General Health   How would you rate your  overall physical health? Excellent   Feel stress (tense, anxious, or unable to sleep) Only a little   (!) STRESS CONCERN      6/11/2024   Nutrition   Three or more servings of calcium each day? Yes   Diet: Regular (no restrictions)   How many servings of fruit and vegetables per day? (!) 2-3   How many sweetened beverages each day? 0-1         6/11/2024   Exercise   Days per week of moderate/strenous exercise 6 days   Average minutes spent exercising at this level 30 min         6/11/2024   Social Factors   Frequency of gathering with friends or relatives Once a week   Worry food won't last until get money to buy more No   Food not last or not have enough money for food? No   Do you have housing?  Yes   Are you worried about losing your housing? No   Lack of transportation? No   Unable to get utilities (heat,electricity)? No         6/11/2024   Fall Risk   Fallen 2 or more times in the past year? No   Trouble with walking or balance? No          6/11/2024   Dental   Dentist two times every year? Yes         6/11/2024   TB Screening   Were you born outside of the [place]? No         Today's PHQ-2 Score:       6/11/2024     8:34 PM   PHQ-2 ( 1999 [employer])   Q1: Little interest or pleasure in doing things 0   Q2: Feeling down, depressed or hopeless 0   PHQ-2 Score 0   Q1: Little interest or pleasure in doing things Not at all   Q2: Feeling down, depressed or hopeless Not at all   PHQ-2 Score 0           6/11/2024   Substance Use   Alcohol more than 3/day or more than 7/wk Not Applicable   Do you use any other substances recreationally? No     Social History     Tobacco Use    Smoking status: Never    Smokeless tobacco: Never    Tobacco comments:     Nonsmoking household   Vaping Use    Vaping status: Never Used   Substance Use Topics    Alcohol use: No    Drug use: No             6/11/2024   Breast Cancer Screening   Family history of breast, colon, or ovarian cancer? Yes         6/11/2024   LAST FHS-7 RESULTS   1st degree  relative breast or ovarian cancer No   Any relative bilateral breast cancer No   Any male have breast cancer No   Any ONE woman have BOTH breast AND ovarian cancer No   Any woman with breast cancer before 50yrs No   2 or more relatives with breast AND/OR ovarian cancer No   2 or more relatives with breast AND/OR bowel cancer No        Mammogram Screening - Mammogram every 1-2 years updated in Health Maintenance based on mutual decision making    G 3 P 3   Patient's last menstrual period was 05/22/2024 (exact date).     Fasting: No   Td: tdap 2/2020       Flu: 3/2020      Covid: 6/2021      Shingrix: due      PPV: NA       RSV: NA             Cholesterol:   Lab Results   Component Value Date    CHOL 175 05/26/2021     Lab Results   Component Value Date    HDL 59 05/26/2021     Lab Results   Component Value Date     05/26/2021     Lab Results   Component Value Date    TRIG 57 05/26/2021     Lab Results   Component Value Date    CHOLHDLRATIO 3.0 07/31/2015         MMG: 10/2022  Dexa:  NA     Flex/colo: 3/2020      Seat Belt: Yes    Sunscreen use: Yes   Calcium Intake: adeq  Health Care Directive: Yes   Sexually Active: Yes     Current contraception: Essure  History of abnormal Pap smear: No  Family history of colon/breast/ovarian cancer: Yes: see [facility]  Regular self breast exam: Yes  History of abnormal mammogram: No          6/11/2024   One time HIV Screening   Previous HIV test? No         6/11/2024   STI Screening   New sexual partner(s) since last STI/HIV test? No     History of abnormal Pap smear: No - age 30- 64 PAP with HPV every 5 years recommended        Latest Ref Rng & Units 2/13/2020     9:50 AM 2/13/2020     9:38 AM 8/7/2015    12:00 AM   PAP / HPV   PAP (Historical)   NIL  NIL    HPV 16 DNA NEG^Negative Negative      HPV 18 DNA NEG^Negative Negative      Other HR HPV NEG^Negative Negative        ASCVD Risk   The 10-year ASCVD risk score (Agnieszka ALANIS, et al., 2019) is: 0.9%    Values used to  calculate the score:      Age: 48 years      Sex: Female      Is Non- : No      Diabetic: No      Tobacco smoker: No      Systolic Blood Pressure: 124 mmHg      Is BP treated: No      HDL Cholesterol: 59 mg/dL      Total Cholesterol: 175 mg/dL            6/11/2024   Contraception/Family Planning   Questions about contraception or family planning No        Reviewed and updated as needed this visit by Provider   Tobacco  Allergies  Meds  Problems  Med Hx  Surg Hx  Fam Hx            Labs reviewed in EPIC  BP Readings from Last 3 Encounters:   06/12/24 124/81   06/07/23 135/75   06/02/22 123/72    Wt Readings from Last 3 Encounters:   06/12/24 61.3 kg (135 lb 3.2 oz)   06/07/23 59.9 kg (132 lb)   06/02/22 58.7 kg (129 lb 6.4 oz)                  Patient Active Problem List   Diagnosis    Status post LASIK surgery    Dry eye syndrome    CARDIOVASCULAR SCREENING; LDL GOAL LESS THAN 160    Recurrent cold sores    POD (perioral dermatitis)    Insomnia    Chronic rhinitis    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    Hx of LASIK    MMT (medial meniscus tear)    Psoriasis    Herpes labialis     Past Surgical History:   Procedure Laterality Date    ARTHROSCOPY KNEE Right 5/27/2015    Procedure: ARTHROSCOPY KNEE;  Surgeon: Dima Haile MD;  Location: [facility] OR    COLONOSCOPY  3/2020    D & C      HC HYSTEROS W PERMANENT FALLOPAIN IMPLANT      essure    LASIK      2008       Social History     Tobacco Use    Smoking status: Never    Smokeless tobacco: Never    Tobacco comments:     Nonsmoking household   Substance Use Topics    Alcohol use: No     Family History   Problem Relation Age of Onset    Cancer Paternal Grandmother 50        started as colon, mets to liver    Diabetes Paternal Grandmother     Colon Cancer Paternal Grandmother         passed away in 1983 from cancer    Hypertension Mother     Thyroid Disease Mother         parathyroid    Osteoporosis Mother          "osteopenia    Hypertension Maternal Grandmother     Hypertension Maternal Grandfather     Diabetes Maternal Grandfather     Gastrointestinal Disease Sister         colitis    Gastrointestinal Disease Maternal Uncle         crohns    Glaucoma No family hx of     Macular Degeneration No family hx of          Current Outpatient Medications   Medication Sig Dispense Refill    CALCIUM + D 600-200 MG-UNIT OR TABS 1 TABLET DAILY      carboxymethylcellul-glycerin (OPTIVE) 0.5-0.9 % SOLN Apply 1 drop to eye 3 times daily. \"Refresh Optive artificial tears\"      clindamycin (CLINDAMAX) 1 % external gel Apply topically 2 times daily Profile RX - do not fill until pt requests or is due for refill. 60 g 12    LUTEIN PO Take by mouth daily      MULTIVITAMIN TABS   OR 1 TABLET DAILY      triamcinolone (KENALOG) 0.1 % external cream Apply  topically 2 times daily. 80 g 3    valACYclovir (VALTREX) 1000 mg tablet 2 TABLETS every 12 hours for two doses 12 tablet 6    zolpidem (AMBIEN) 5 MG tablet Take 1 tablet (5 mg) by mouth nightly as needed for sleep at bedtime. 90 tablet 3         Review of Systems  Constitutional, neuro, ENT, endocrine, pulmonary, cardiac, gastrointestinal, genitourinary, musculoskeletal, integument and psychiatric systems are negative, except as otherwise noted.     Objective    Exam  /81   Pulse 77   Temp 98.5  F (36.9  C) (Oral)   Resp 14   Ht 1.575 m (5' 2\")   Wt 61.3 kg (135 lb 3.2 oz)   LMP 05/22/2024 (Exact Date)   SpO2 99%   BMI 24.73 kg/m     Estimated body mass index is 24.73 kg/m  as calculated from the following:    Height as of this encounter: 1.575 m (5' 2\").    Weight as of this encounter: 61.3 kg (135 lb 3.2 oz).    Physical Exam  GENERAL: alert and no distress  EYES: Eyes grossly normal to inspection, PERRL and conjunctivae and sclerae normal  HENT: ear canals and TM's normal, nose and mouth without ulcers or lesions  NECK: no adenopathy, no asymmetry, masses, or scars  RESP: lungs " clear to auscultation - no rales, rhonchi or wheezes  BREAST: normal without masses, tenderness or nipple discharge and no palpable axillary masses or adenopathy  CV: regular rate and rhythm, normal S1 S2, no S3 or S4, no murmur, click or rub, no peripheral edema  ABDOMEN: soft, nontender, no hepatosplenomegaly, no masses and bowel sounds normal  MS: no gross musculoskeletal defects noted, no edema  SKIN: no suspicious lesions or rashes  NEURO: Normal strength and tone, mentation intact and speech normal  PSYCH: mentation appears normal, affect normal/bright        Signed Electronically by: Bing Coughlin MD

## 2024-09-03 ENCOUNTER — PATIENT OUTREACH (OUTPATIENT)
Dept: CARE COORDINATION | Facility: CLINIC | Age: 49
End: 2024-09-03
Payer: COMMERCIAL

## 2024-10-01 ENCOUNTER — PATIENT OUTREACH (OUTPATIENT)
Dept: CARE COORDINATION | Facility: CLINIC | Age: 49
End: 2024-10-01
Payer: COMMERCIAL

## 2024-10-17 ENCOUNTER — ANCILLARY PROCEDURE (OUTPATIENT)
Dept: MAMMOGRAPHY | Facility: CLINIC | Age: 49
End: 2024-10-17
Attending: FAMILY MEDICINE
Payer: COMMERCIAL

## 2024-10-17 DIAGNOSIS — Z12.31 VISIT FOR SCREENING MAMMOGRAM: ICD-10-CM

## 2024-10-17 PROCEDURE — 77063 BREAST TOMOSYNTHESIS BI: CPT | Mod: TC | Performed by: RADIOLOGY

## 2024-10-17 PROCEDURE — 77067 SCR MAMMO BI INCL CAD: CPT | Mod: TC | Performed by: RADIOLOGY

## 2024-10-18 ENCOUNTER — ANCILLARY ORDERS (OUTPATIENT)
Dept: MAMMOGRAPHY | Facility: CLINIC | Age: 49
End: 2024-10-18

## 2024-10-18 DIAGNOSIS — R92.8 ABNORMAL MAMMOGRAM: Primary | ICD-10-CM

## 2024-10-25 ENCOUNTER — ANCILLARY PROCEDURE (OUTPATIENT)
Dept: MAMMOGRAPHY | Facility: CLINIC | Age: 49
End: 2024-10-25
Attending: FAMILY MEDICINE
Payer: COMMERCIAL

## 2024-10-25 DIAGNOSIS — R92.8 ABNORMAL MAMMOGRAM: ICD-10-CM

## 2024-10-25 PROCEDURE — 77065 DX MAMMO INCL CAD UNI: CPT | Mod: RT

## 2024-10-25 PROCEDURE — G0279 TOMOSYNTHESIS, MAMMO: HCPCS

## 2024-11-14 ENCOUNTER — NURSE TRIAGE (OUTPATIENT)
Dept: FAMILY MEDICINE | Facility: CLINIC | Age: 49
End: 2024-11-14
Payer: COMMERCIAL

## 2024-11-14 NOTE — TELEPHONE ENCOUNTER
"Patient calling in with concerns about ears feeling plugged. Reports ears felt weird on Monday night after sleeping on it but cleared on Tuesday morning. Reports sx returned and ears has felt plugged all day yesterday and today. See triage assessment below for additional information.     Per disposition scheduled appointment with available provider on 11/15/24. Patient verbalized understanding.     MEE Greer  [facility] Triage  [place]    Reason for Disposition   Ear congestion present > 48 hours    Additional Information   Negative: Ear pain is main symptom   Negative: Hearing loss (complete or partial) is main symptom   Negative: Earwax is main concern   Negative: Has nasal allergies and they are acting up   Negative: Earache lasts > 1 hour   Negative: Pus or cloudy discharge from ear canal   Negative: Patient wants to be seen    Answer Assessment - Initial Assessment Questions  1. LOCATION: \"Which ear is involved?\"        Right ear   2. SENSATION: \"Describe how the ear feels.\" (e.g. stuffy, full, plugged).\"       Feels plugged all day since yesterday and today. Reports on Monday, ear \"felt weird\" when she slept on it but ear clear on Tuesday morning. States  Tuesday night into Wednesday happened again and didn't;t clear   3. ONSET:  \"When did the ear symptoms start?\"        Monday night  4. PAIN: \"Do you also have an earache?\" If Yes, ask: \"How bad is it?\" (Scale 1-10; or mild, moderate, severe)      Denies ear pain   5. CAUSE: \"What do you think is causing the ear congestion?\"      Patient wondering if earwax could be built up inside. Reports has never had ear issues in the past.   6. URI: \"Do you have a runny nose or cough?\"       \"No, nothing major\" Reports occasional clearing of nose.   7. NASAL ALLERGIES: \"Are there symptoms of hay fever, such as sneezing or a clear nasal discharge?\"      Denies   8. PREGNANCY: \"Is there any chance you are pregnant?\" \"When was your last menstrual period?\"      " N/a    Protocols used: Ear - Congestion-A-OH

## 2024-11-15 ENCOUNTER — OFFICE VISIT (OUTPATIENT)
Dept: FAMILY MEDICINE | Facility: CLINIC | Age: 49
End: 2024-11-15
Payer: COMMERCIAL

## 2024-11-15 VITALS
WEIGHT: 137.2 LBS | HEIGHT: 62 IN | OXYGEN SATURATION: 99 % | TEMPERATURE: 98 F | HEART RATE: 77 BPM | DIASTOLIC BLOOD PRESSURE: 83 MMHG | SYSTOLIC BLOOD PRESSURE: 138 MMHG | RESPIRATION RATE: 20 BRPM | BODY MASS INDEX: 25.25 KG/M2

## 2024-11-15 DIAGNOSIS — H61.21 IMPACTED CERUMEN OF RIGHT EAR: Primary | ICD-10-CM

## 2024-11-15 PROCEDURE — 99213 OFFICE O/P EST LOW 20 MIN: CPT | Performed by: NURSE PRACTITIONER

## 2024-11-15 ASSESSMENT — PAIN SCALES - GENERAL: PAINLEVEL_OUTOF10: NO PAIN (0)

## 2024-11-15 NOTE — PROGRESS NOTES
Preventive Care Visit  [facility]  SOFIA Alexander CNP, Family Medicine  Nov 15, 2024  {Provider  Link to [facility] :230717}    {PROVIDER CHARTING PREFERENCE:351861}    Ricci Stallings is a 49 year old, presenting for the following:  Ear Problem        11/15/2024    10:41 AM   Additional Questions   Roomed by Gladis WINTER   Accompanied by self      Ear Problem    History of Present Illness       Reason for visit:  My right ear is plugged  Symptom onset:  3-7 days ago  Symptoms include:  Can't hear well out of right ear, feels strange  Symptom intensity:  Moderate  Symptom progression:  Worsening  Had these symptoms before:  No  What makes it worse:  No  What makes it better:  No   She is taking medications regularly.      Health Care Directive  Patient does not have a Health Care Directive: {ADVANCE_DIRECTIVE_STATUS:683391}      6/11/2024   General Health   How would you rate your overall physical health? Excellent   Feel stress (tense, anxious, or unable to sleep) Only a little            6/11/2024   Nutrition   Three or more servings of calcium each day? Yes   Diet: Regular (no restrictions)   How many servings of fruit and vegetables per day? (!) 2-3   How many sweetened beverages each day? 0-1            6/11/2024   Exercise   Days per week of moderate/strenous exercise 6 days   Average minutes spent exercising at this level 30 min            6/11/2024   Social Factors   Frequency of gathering with friends or relatives Once a week   Worry food won't last until get money to buy more No   Food not last or not have enough money for food? No   Do you have housing? (Housing is defined as stable permanent housing and does not include staying ouside in a car, in a tent, in an abandoned building, in an overnight shelter, or couch-surfing.) Yes   Are you worried about losing your housing? No   Lack of transportation? No   Unable to get utilities (heat,electricity)? No            6/11/2024    Dental   Dentist two times every year? Yes            6/11/2024   TB Screening   Were you born outside of the [place]? No          {Rooming Staff Patient needs a PHQ as part of the AWV.  Use this link to complete and then refresh the note to pull results Link to PHQ2 Assessment :525286}  {USE TO PULL IN PHQ RESULTS FOR TODAY:086929}      6/11/2024   Substance Use   Alcohol more than 3/day or more than 7/wk Not Applicable   Do you use any other substances recreationally? No        Social History     Tobacco Use    Smoking status: Never    Smokeless tobacco: Never    Tobacco comments:     Nonsmoking household   Vaping Use    Vaping status: Never Used   Substance Use Topics    Alcohol use: No    Drug use: No     {Provider  If there are gaps in the social history shown above, please follow the link to update and then refresh the note Link to Social and Substance History :166839}      10/17/2024   LAST FHS-7 RESULTS   1st degree relative breast or ovarian cancer No   Any relative bilateral breast cancer No   Any male have breast cancer No   Any ONE woman have BOTH breast AND ovarian cancer No   Any woman with breast cancer before 50yrs No   2 or more relatives with breast AND/OR ovarian cancer No   2 or more relatives with breast AND/OR bowel cancer No        {If any of the questions to the FHS7 are answered yes, consider referral for genetic counseling.    Additional indications for genetic referral include personal history of breast or ovarian cancer, genetic mutation in 1st degree relative which increases risk of breast cancer including BRCA1, BRCA2, DONALD, PALB 2, TP53, CHEK2, PTEN, CDH1, STK11 (per ACS) and/or 1st degree relative with history of pancreatic or high-risk prostate cancer (per NCCN):321350}   {Mammogram Decision Support (Optional):172501}      History of abnormal Pap smear: { :533570}        Latest Ref Rng & Units 2/13/2020     9:50 AM 2/13/2020     9:38 AM 8/7/2015    12:00 AM   PAP / HPV   PAP  "(Historical)   NIL  NIL    HPV 16 DNA NEG^Negative Negative      HPV 18 DNA NEG^Negative Negative      Other HR HPV NEG^Negative Negative        ASCVD Risk   The 10-year ASCVD risk score (Agnieszka ALANIS, et al., 2019) is: 1%    Values used to calculate the score:      Age: 49 years      Sex: Female      Is Non- : No      Diabetic: No      Tobacco smoker: No      Systolic Blood Pressure: 124 mmHg      Is BP treated: No      HDL Cholesterol: 59 mg/dL      Total Cholesterol: 174 mg/dL    {Link to Fracture Risk Assessment Tool (Optional):184448}    {Provider  REQUIRED FOR AWV Use the storyboard to review patient history, after sections have been marked as reviewed, refresh note to capture documentation:937876}   Reviewed and updated as needed this visit by Provider                    {HISTORY OPTIONS (Optional):182187}    {ROS Picklists (Optional):279797}     Objective    Exam  There were no vitals taken for this visit.   Estimated body mass index is 24.73 kg/m  as calculated from the following:    Height as of 6/12/24: 1.575 m (5' 2\").    Weight as of 6/12/24: 61.3 kg (135 lb 3.2 oz).    Physical Exam  {Exam Choices (Optional):253813}        Signed Electronically by: SOFIA Alexander CNP  {Email feedback regarding this note to [email or web address]   :703092}  "

## 2025-05-20 ENCOUNTER — TELEPHONE (OUTPATIENT)
Dept: FAMILY MEDICINE | Facility: CLINIC | Age: 50
End: 2025-05-20
Payer: COMMERCIAL

## 2025-05-20 NOTE — TELEPHONE ENCOUNTER
Patient is scheduled for annual wellness exam on 6/16/25.      Would you like pre-visit labs?         Are these labs fasting or non-fasting?      If no labs are needed, please close this encounter. If you are placing labs, please route back to [facility] [place] so we can call and schedule.      Navin Santos

## 2025-06-15 SDOH — HEALTH STABILITY: PHYSICAL HEALTH: ON AVERAGE, HOW MANY DAYS PER WEEK DO YOU ENGAGE IN MODERATE TO STRENUOUS EXERCISE (LIKE A BRISK WALK)?: 6 DAYS

## 2025-06-15 SDOH — HEALTH STABILITY: PHYSICAL HEALTH: ON AVERAGE, HOW MANY MINUTES DO YOU ENGAGE IN EXERCISE AT THIS LEVEL?: 30 MIN

## 2025-06-15 ASSESSMENT — SOCIAL DETERMINANTS OF HEALTH (SDOH): HOW OFTEN DO YOU GET TOGETHER WITH FRIENDS OR RELATIVES?: ONCE A WEEK

## 2025-06-16 ENCOUNTER — OFFICE VISIT (OUTPATIENT)
Dept: FAMILY MEDICINE | Facility: CLINIC | Age: 50
End: 2025-06-16
Payer: COMMERCIAL

## 2025-06-16 VITALS
BODY MASS INDEX: 24.73 KG/M2 | WEIGHT: 134.4 LBS | HEIGHT: 62 IN | HEART RATE: 71 BPM | DIASTOLIC BLOOD PRESSURE: 81 MMHG | SYSTOLIC BLOOD PRESSURE: 131 MMHG | RESPIRATION RATE: 16 BRPM | TEMPERATURE: 98.4 F | OXYGEN SATURATION: 96 %

## 2025-06-16 DIAGNOSIS — L40.9 PSORIASIS: ICD-10-CM

## 2025-06-16 DIAGNOSIS — Z00.00 ROUTINE GENERAL MEDICAL EXAMINATION AT A HEALTH CARE FACILITY: Primary | ICD-10-CM

## 2025-06-16 DIAGNOSIS — B00.1 HERPES LABIALIS: ICD-10-CM

## 2025-06-16 DIAGNOSIS — L71.0 POD (PERIORAL DERMATITIS): ICD-10-CM

## 2025-06-16 DIAGNOSIS — G47.00 INSOMNIA, UNSPECIFIED TYPE: ICD-10-CM

## 2025-06-16 LAB
HPV HR 12 DNA CVX QL NAA+PROBE: NEGATIVE
HPV16 DNA CVX QL NAA+PROBE: NEGATIVE
HPV18 DNA CVX QL NAA+PROBE: NEGATIVE
HUMAN PAPILLOMA VIRUS FINAL DIAGNOSIS: NORMAL

## 2025-06-16 PROCEDURE — 99396 PREV VISIT EST AGE 40-64: CPT | Performed by: FAMILY MEDICINE

## 2025-06-16 PROCEDURE — 87624 HPV HI-RISK TYP POOLED RSLT: CPT | Performed by: FAMILY MEDICINE

## 2025-06-16 PROCEDURE — 99214 OFFICE O/P EST MOD 30 MIN: CPT | Mod: 25 | Performed by: FAMILY MEDICINE

## 2025-06-16 PROCEDURE — 3075F SYST BP GE 130 - 139MM HG: CPT | Performed by: FAMILY MEDICINE

## 2025-06-16 PROCEDURE — 1126F AMNT PAIN NOTED NONE PRSNT: CPT | Performed by: FAMILY MEDICINE

## 2025-06-16 PROCEDURE — 3079F DIAST BP 80-89 MM HG: CPT | Performed by: FAMILY MEDICINE

## 2025-06-16 PROCEDURE — G2211 COMPLEX E/M VISIT ADD ON: HCPCS | Performed by: FAMILY MEDICINE

## 2025-06-16 RX ORDER — ZOLPIDEM TARTRATE 5 MG/1
5 TABLET ORAL
Qty: 90 TABLET | Refills: 3 | Status: SHIPPED | OUTPATIENT
Start: 2025-06-16 | End: 2025-06-16

## 2025-06-16 RX ORDER — VALACYCLOVIR HYDROCHLORIDE 1 G/1
TABLET, FILM COATED ORAL
Qty: 12 TABLET | Refills: 6 | Status: SHIPPED | OUTPATIENT
Start: 2025-06-16

## 2025-06-16 RX ORDER — ZOLPIDEM TARTRATE 5 MG/1
5 TABLET ORAL
Qty: 90 TABLET | Refills: 3 | Status: SHIPPED | OUTPATIENT
Start: 2025-06-16

## 2025-06-16 RX ORDER — TRIAMCINOLONE ACETONIDE 1 MG/G
CREAM TOPICAL
Qty: 15 G | Refills: 5 | Status: SHIPPED | OUTPATIENT
Start: 2025-06-16

## 2025-06-16 RX ORDER — CLINDAMYCIN PHOSPHATE 10 MG/G
GEL TOPICAL 2 TIMES DAILY
Qty: 60 G | Refills: 11 | Status: SHIPPED | OUTPATIENT
Start: 2025-06-16

## 2025-06-16 ASSESSMENT — PAIN SCALES - GENERAL: PAINLEVEL_OUTOF10: NO PAIN (0)

## 2025-06-16 NOTE — PATIENT INSTRUCTIONS
Patient Education   Preventive Care Advice   This is general advice given by our system to help you stay healthy. However, your care team may have specific advice just for you. Please talk to your care team about your preventive care needs.  Nutrition  Eat 5 or more servings of fruits and vegetables each day.  Try wheat bread, brown rice and whole grain pasta (instead of white bread, rice, and pasta).  Get enough calcium and vitamin D. Check the label on foods and aim for 100% of the RDA (recommended daily allowance).  Lifestyle  Exercise at least 150 minutes each week  (30 minutes a day, 5 days a week).  Do muscle strengthening activities 2 days a week. These help control your weight and prevent disease.  No smoking.  Wear sunscreen to prevent skin cancer.  Have a dental exam and cleaning every 6 months.  Yearly exams  See your health care team every year to talk about:  Any changes in your health.  Any medicines your care team has prescribed.  Preventive care, family planning, and ways to prevent chronic diseases.  Shots (vaccines)   HPV shots (up to age 26), if you've never had them before.  Hepatitis B shots (up to age 59), if you've never had them before.  COVID-19 shot: Get this shot when it's due.  Flu shot: Get a flu shot every year.  Tetanus shot: Get a tetanus shot every 10 years.  Pneumococcal, hepatitis A, and RSV shots: Ask your care team if you need these based on your risk.  Shingles shot (for age 50 and up)  General health tests  Diabetes screening:  Starting at age 35, Get screened for diabetes at least every 3 years.  If you are younger than age 35, ask your care team if you should be screened for diabetes.  Cholesterol test: At age 39, start having a cholesterol test every 5 years, or more often if advised.  Bone density scan (DEXA): At age 50, ask your care team if you should have this scan for osteoporosis (brittle bones).  Hepatitis C: Get tested at least once in your life.  STIs (sexually  transmitted infections)  Before age 24: Ask your care team if you should be screened for STIs.  After age 24: Get screened for STIs if you're at risk. You are at risk for STIs (including HIV) if:  You are sexually active with more than one person.  You don't use condoms every time.  You or a partner was diagnosed with a sexually transmitted infection.  If you are at risk for HIV, ask about PrEP medicine to prevent HIV.  Get tested for HIV at least once in your life, whether you are at risk for HIV or not.  Cancer screening tests  Cervical cancer screening: If you have a cervix, begin getting regular cervical cancer screening tests starting at age 21.  Breast cancer scan (mammogram): If you've ever had breasts, begin having regular mammograms starting at age 40. This is a scan to check for breast cancer.  Colon cancer screening: It is important to start screening for colon cancer at age 45.  Have a colonoscopy test every 10 years (or more often if you're at risk) Or, ask your provider about stool tests like a FIT test every year or Cologuard test every 3 years.  To learn more about your testing options, visit:   .  For help making a decision, visit:   https://bit.ly/ah97454.  Prostate cancer screening test: If you have a prostate, ask your care team if a prostate cancer screening test (PSA) at age 55 is right for you.  Lung cancer screening: If you are a current or former smoker ages 50 to 80, ask your care team if ongoing lung cancer screenings are right for you.  For informational purposes only. Not to replace the advice of your health care provider. Copyright   2023 [employer]. All rights reserved. Clinically reviewed by the [facility] Transitions Program. [employer] 294560 - REV 01/24.

## 2025-06-16 NOTE — PROGRESS NOTES
Preventive Care Visit  [facility]  Bing Coughlin MD, Family Medicine  Jun 16, 2025      Assessment & Plan     (Z00.00) Routine general medical examination at a health care facility  (primary encounter diagnosis)  Comment: preventive needs reviewed   Plan: HPV and Gynecologic Cytology Panel -         Recommended Age 30 - 65 Years        see orders in Epic.   Skin tag removed with iris scissors, bandage applied. No charge    (L40.9) Psoriasis  Comment: controlled  Plan: triamcinolone (KENALOG) 0.1 % external cream        Refill x 1 yr     (B00.1) Herpes labialis  Comment: episodic  Plan: valACYclovir (VALTREX) 1000 mg tablet        Refill x 1 yr     (G47.00) Insomnia, unspecified type  Comment: stable,   Plan: zolpidem (AMBIEN) 5 MG tablet        Refill x 1 yr     (L71.0) POD (perioral dermatitis)  Comment: controlled  Plan: clindamycin (CLEOCIN-T) 1 % external gel        Refill x 1 yr         The longitudinal plan of care for the diagnosis(es)/condition(s) as documented were addressed during this visit. Due to the added complexity in care, I will continue to support Haylie in the subsequent management and with ongoing continuity of care.    Counseling  Appropriate preventive services were addressed with this patient via screening, questionnaire, or discussion as appropriate for fall prevention, nutrition, physical activity, Tobacco-use cessation, social engagement, weight loss and cognition.  Checklist reviewing preventive services available has been given to the patient.  Reviewed patient's diet, addressing concerns and/or questions.           Subjective   Haylie is a 49 year old, presenting for the following:  Physical        6/16/2025     8:35 AM   Additional Questions   Roomed by lina   Accompanied by self          HPI           Advance Care Planning    Patient states has Health Care Directive and will send to [facility].        6/15/2025   General Health   How would you rate your  overall physical health? Excellent   Feel stress (tense, anxious, or unable to sleep) Only a little   (!) STRESS CONCERN      6/15/2025   Nutrition   Three or more servings of calcium each day? Yes   Diet: Regular (no restrictions)   How many servings of fruit and vegetables per day? (!) 2-3   How many sweetened beverages each day? 0-1         6/15/2025   Exercise   Days per week of moderate/strenous exercise 6 days   Average minutes spent exercising at this level 30 min         6/15/2025   Social Factors   Frequency of gathering with friends or relatives Once a week   Worry food won't last until get money to buy more No   Food not last or not have enough money for food? No   Do you have housing? (Housing is defined as stable permanent housing and does not include staying outside in a car, in a tent, in an abandoned building, in an overnight shelter, or couch-surfing.) Yes   Are you worried about losing your housing? No   Lack of transportation? No   Unable to get utilities (heat,electricity)? No         6/15/2025   Fall Risk   Fallen 2 or more times in the past year? No   Trouble with walking or balance? No          6/15/2025   Dental   Dentist two times every year? Yes         Today's PHQ-2 Score:       6/15/2025     4:47 PM   PHQ-2 ( 1999 [employer])   Q1: Little interest or pleasure in doing things 0   Q2: Feeling down, depressed or hopeless 0   PHQ-2 Score 0    Q1: Little interest or pleasure in doing things Not at all   Q2: Feeling down, depressed or hopeless Not at all   PHQ-2 Score 0       Patient-reported           6/15/2025   Substance Use   Alcohol more than 3/day or more than 7/wk Not Applicable   Do you use any other substances recreationally? No     Social History     Tobacco Use    Smoking status: Never    Smokeless tobacco: Never    Tobacco comments:     Nonsmoking household   Vaping Use    Vaping status: Never Used   Substance Use Topics    Alcohol use: No    Drug use: No           10/17/2024   LAST  FHS-7 RESULTS   1st degree relative breast or ovarian cancer No   Any relative bilateral breast cancer No   Any male have breast cancer No   Any ONE woman have BOTH breast AND ovarian cancer No   Any woman with breast cancer before 50yrs No   2 or more relatives with breast AND/OR ovarian cancer No   2 or more relatives with breast AND/OR bowel cancer No        Mammogram Screening - Mammogram every 1-2 years updated in Health Maintenance based on mutual decision making    G 3 P 3   Patient's last menstrual period was 06/05/2025 (approximate).     Fasting: No   Td: 2/2020       Flu: 2/2020      Covid: 6/2021      Shingrix: done      PPV: discussed       RSV: NA               Cholesterol:   Lab Results   Component Value Date    CHOL 174 06/12/2024    CHOL 175 05/26/2021     Lab Results   Component Value Date    HDL 59 06/12/2024    HDL 59 05/26/2021     Lab Results   Component Value Date     06/12/2024     05/26/2021     Lab Results   Component Value Date    TRIG 51 06/12/2024    TRIG 57 05/26/2021     Lab Results   Component Value Date    CHOLHDLRATIO 3.0 07/31/2015         MMG: 10/2024  Dexa:  NA     Flex/colo: 3/2020 q10y scope      Seat Belt: Yes    Sunscreen use: Yes   Calcium Intake: adeq  Health Care Directive: Yes   Sexually Active: Yes     Current contraception: Essure  History of abnormal Pap smear: No  Family history of colon/breast/ovarian cancer: Yes: see [facility]  Regular self breast exam: Yes  History of abnormal mammogram: No          6/15/2025   STI Screening   New sexual partner(s) since last STI/HIV test? No     History of abnormal Pap smear: No - age 30- 64 PAP with HPV every 5 years recommended        Latest Ref Rng & Units 2/13/2020     9:50 AM 2/13/2020     9:38 AM 8/7/2015    12:00 AM   PAP / HPV   PAP (Historical)   NIL  NIL    HPV 16 DNA NEG^Negative Negative      HPV 18 DNA NEG^Negative Negative      Other HR HPV NEG^Negative Negative        ASCVD Risk   The 10-year ASCVD  risk score (Agnieszka ALANIS, et al., 2019) is: 1.1%    Values used to calculate the score:      Age: 49 years      Sex: Female      Is Non- : No      Diabetic: No      Tobacco smoker: No      Systolic Blood Pressure: 131 mmHg      Is BP treated: No      HDL Cholesterol: 59 mg/dL      Total Cholesterol: 174 mg/dL           Reviewed and updated as needed this visit by Provider   Tobacco  Allergies  Meds  Problems  Med Hx  Surg Hx  Fam Hx            Labs reviewed in EPIC  BP Readings from Last 3 Encounters:   06/16/25 131/81   11/15/24 138/83   06/12/24 124/81    Wt Readings from Last 3 Encounters:   06/16/25 61 kg (134 lb 6.4 oz)   11/15/24 62.2 kg (137 lb 3.2 oz)   06/12/24 61.3 kg (135 lb 3.2 oz)                  Patient Active Problem List   Diagnosis    Status post LASIK surgery    Dry eye syndrome    CARDIOVASCULAR SCREENING; LDL GOAL LESS THAN 160    Recurrent cold sores    POD (perioral dermatitis)    Insomnia    Chronic rhinitis    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    Hx of LASIK    MMT (medial meniscus tear)    Psoriasis    Herpes labialis     Past Surgical History:   Procedure Laterality Date    ARTHROSCOPY KNEE Right 5/27/2015    Procedure: ARTHROSCOPY KNEE;  Surgeon: Dima Haile MD;  Location: [facility] OR    COLONOSCOPY  3/2020    D & C      HC HYSTEROS W PERMANENT FALLOPAIN IMPLANT      essure    LASIK      2008       Social History     Tobacco Use    Smoking status: Never    Smokeless tobacco: Never    Tobacco comments:     Nonsmoking household   Substance Use Topics    Alcohol use: No     Family History   Problem Relation Age of Onset    Cancer Paternal Grandmother 50        started as colon, mets to liver    Diabetes Paternal Grandmother     Colon Cancer Paternal Grandmother         passed away in 1983 from cancer    Hypertension Mother     Thyroid Disease Mother         parathyroid    Osteoporosis Mother         osteopenia    Hypertension Maternal  "Grandmother     Hypertension Maternal Grandfather     Diabetes Maternal Grandfather     Gastrointestinal Disease Sister         colitis    Gastrointestinal Disease Maternal Uncle         crohns    Glaucoma No family hx of     Macular Degeneration No family hx of          Current Outpatient Medications   Medication Sig Dispense Refill    CALCIUM + D 600-200 MG-UNIT OR TABS 1 TABLET DAILY      carboxymethylcellul-glycerin (OPTIVE) 0.5-0.9 % SOLN Apply 1 drop to eye 3 times daily. \"Refresh Optive artificial tears\"      clindamycin (CLEOCIN-T) 1 % external gel Apply topically 2 times daily Profile RX - do not fill until pt requests or is due for refill. 60 g 12    LUTEIN PO Take by mouth daily      MULTIVITAMIN TABS   OR 1 TABLET DAILY      triamcinolone (KENALOG) 0.1 % external cream Apply  topically 2 times daily. 15 g 5    valACYclovir (VALTREX) 1000 mg tablet 2 TABLETS every 12 hours for two doses 12 tablet 6    zolpidem (AMBIEN) 5 MG tablet Take 1 tablet (5 mg) by mouth nightly as needed for sleep at bedtime. 90 tablet 3         Review of Systems  Constitutional, neuro, ENT, endocrine, pulmonary, cardiac, gastrointestinal, genitourinary, musculoskeletal, integument and psychiatric systems are negative, except as otherwise noted.     Objective    Exam  /81   Pulse 71   Temp 98.4  F (36.9  C) (Oral)   Resp 16   Ht 1.58 m (5' 2.21\")   Wt 61 kg (134 lb 6.4 oz)   LMP 06/05/2025 (Approximate)   SpO2 96%   BMI 24.42 kg/m     Estimated body mass index is 24.42 kg/m  as calculated from the following:    Height as of this encounter: 1.58 m (5' 2.21\").    Weight as of this encounter: 61 kg (134 lb 6.4 oz).    Physical Exam  GENERAL: alert and no distress  EYES: Eyes grossly normal to inspection, PERRL and conjunctivae and sclerae normal  HENT: ear canals and TM's normal, nose and mouth without ulcers or lesions  NECK: no adenopathy, no asymmetry, masses, or scars  RESP: lungs clear to auscultation - no rales, " rhonchi or wheezes  BREAST: normal without masses, tenderness or nipple discharge and no palpable axillary masses or adenopathy, 1 cm pedunculated skin tag on right upper breast 3cm lateral to areola on very small stalk.  CV: regular rate and rhythm, normal S1 S2, no S3 or S4, no murmur, click or rub, no peripheral edema  ABDOMEN: soft, nontender, no hepatosplenomegaly, no masses and bowel sounds normal  MS: no gross musculoskeletal defects noted, no edema  SKIN: no suspicious lesions or rashes  NEURO: Normal strength and tone, mentation intact and speech normal  PSYCH: mentation appears normal, affect normal/bright        Signed Electronically by: Bing Coughlin MD

## 2025-06-17 ENCOUNTER — RESULTS FOLLOW-UP (OUTPATIENT)
Dept: OBGYN | Facility: CLINIC | Age: 50
End: 2025-06-17